# Patient Record
Sex: MALE | Race: WHITE | NOT HISPANIC OR LATINO | Employment: FULL TIME | URBAN - METROPOLITAN AREA
[De-identification: names, ages, dates, MRNs, and addresses within clinical notes are randomized per-mention and may not be internally consistent; named-entity substitution may affect disease eponyms.]

---

## 2019-12-19 DIAGNOSIS — M51.16 NEURITIS OR RADICULITIS DUE TO RUPTURE OF LUMBAR INTERVERTEBRAL DISC: Primary | ICD-10-CM

## 2020-01-06 ENCOUNTER — HOSPITAL ENCOUNTER (OUTPATIENT)
Dept: RADIOLOGY | Facility: HOSPITAL | Age: 35
Discharge: HOME | End: 2020-01-06
Attending: NEUROLOGICAL SURGERY
Payer: COMMERCIAL

## 2020-01-06 DIAGNOSIS — M51.16 NEURITIS OR RADICULITIS DUE TO RUPTURE OF LUMBAR INTERVERTEBRAL DISC: ICD-10-CM

## 2021-08-31 ENCOUNTER — HOSPITAL ENCOUNTER (EMERGENCY)
Facility: HOSPITAL | Age: 36
Discharge: HOME | End: 2021-08-31
Attending: EMERGENCY MEDICINE

## 2021-08-31 VITALS
HEIGHT: 71 IN | TEMPERATURE: 99 F | BODY MASS INDEX: 39.26 KG/M2 | DIASTOLIC BLOOD PRESSURE: 79 MMHG | RESPIRATION RATE: 20 BRPM | SYSTOLIC BLOOD PRESSURE: 137 MMHG | WEIGHT: 280.44 LBS | HEART RATE: 94 BPM | OXYGEN SATURATION: 98 %

## 2021-08-31 DIAGNOSIS — L02.511 ABSCESS OF HAND, RIGHT: ICD-10-CM

## 2021-08-31 DIAGNOSIS — L03.115 CELLULITIS OF RIGHT LOWER EXTREMITY: Primary | ICD-10-CM

## 2021-08-31 PROCEDURE — 96365 THER/PROPH/DIAG IV INF INIT: CPT

## 2021-08-31 PROCEDURE — 99284 EMERGENCY DEPT VISIT MOD MDM: CPT | Mod: 25

## 2021-08-31 PROCEDURE — 25000003 PHARM REV CODE 250: Performed by: PHYSICIAN ASSISTANT

## 2021-08-31 RX ORDER — CLINDAMYCIN HYDROCHLORIDE 150 MG/1
300 CAPSULE ORAL 4 TIMES DAILY
Qty: 56 CAPSULE | Refills: 0 | Status: SHIPPED | OUTPATIENT
Start: 2021-08-31 | End: 2021-09-07

## 2021-08-31 RX ORDER — CLINDAMYCIN PHOSPHATE 600 MG/50ML
600 INJECTION, SOLUTION INTRAVENOUS
Status: COMPLETED | OUTPATIENT
Start: 2021-08-31 | End: 2021-08-31

## 2021-08-31 RX ORDER — HYDROCODONE BITARTRATE AND ACETAMINOPHEN 5; 325 MG/1; MG/1
1 TABLET ORAL EVERY 6 HOURS PRN
Qty: 12 TABLET | Refills: 0 | Status: SHIPPED | OUTPATIENT
Start: 2021-08-31 | End: 2021-09-03

## 2021-08-31 RX ORDER — MUPIROCIN 20 MG/G
OINTMENT TOPICAL 3 TIMES DAILY
Qty: 30 G | Refills: 0 | Status: SHIPPED | OUTPATIENT
Start: 2021-08-31 | End: 2021-09-10

## 2021-08-31 RX ADMIN — CLINDAMYCIN IN 5 PERCENT DEXTROSE 600 MG: 12 INJECTION, SOLUTION INTRAVENOUS at 05:08

## 2022-01-11 ENCOUNTER — HOSPITAL ENCOUNTER (EMERGENCY)
Facility: HOSPITAL | Age: 37
Discharge: HOME | End: 2022-01-11
Attending: EMERGENCY MEDICINE

## 2022-01-11 VITALS
WEIGHT: 280 LBS | DIASTOLIC BLOOD PRESSURE: 85 MMHG | RESPIRATION RATE: 18 BRPM | OXYGEN SATURATION: 99 % | BODY MASS INDEX: 39.2 KG/M2 | HEART RATE: 102 BPM | TEMPERATURE: 98 F | SYSTOLIC BLOOD PRESSURE: 131 MMHG | HEIGHT: 71 IN

## 2022-01-11 DIAGNOSIS — L03.113 CELLULITIS OF RIGHT HAND: ICD-10-CM
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
## 2022-01-11 DIAGNOSIS — S61.451A DOG BITE OF RIGHT HAND, INITIAL ENCOUNTER: Primary | ICD-10-CM

## 2022-01-11 PROCEDURE — 25000003 PHARM REV CODE 250: Performed by: EMERGENCY MEDICINE

## 2022-01-11 PROCEDURE — 90471 IMMUNIZATION ADMIN: CPT | Performed by: EMERGENCY MEDICINE

## 2022-01-11 PROCEDURE — 99284 EMERGENCY DEPT VISIT MOD MDM: CPT

## 2022-01-11 PROCEDURE — 63600175 PHARM REV CODE 636 W HCPCS: Performed by: EMERGENCY MEDICINE

## 2022-01-11 PROCEDURE — 90715 TDAP VACCINE 7 YRS/> IM: CPT | Performed by: EMERGENCY MEDICINE

## 2022-01-11 RX ORDER — AMOXICILLIN AND CLAVULANATE POTASSIUM 875; 125 MG/1; MG/1
1 TABLET, FILM COATED ORAL 2 TIMES DAILY
Qty: 14 TABLET | Refills: 0 | OUTPATIENT
Start: 2022-01-11 | End: 2022-10-26

## 2022-01-11 RX ORDER — AMOXICILLIN AND CLAVULANATE POTASSIUM 875; 125 MG/1; MG/1
1 TABLET, FILM COATED ORAL
Status: COMPLETED | OUTPATIENT
Start: 2022-01-11 | End: 2022-01-11

## 2022-01-11 RX ADMIN — AMOXICILLIN AND CLAVULANATE POTASSIUM 1 TABLET: 875; 125 TABLET, FILM COATED ORAL at 12:01

## 2022-01-11 RX ADMIN — TETANUS TOXOID, REDUCED DIPHTHERIA TOXOID AND ACELLULAR PERTUSSIS VACCINE, ADSORBED 0.5 ML: 5; 2.5; 8; 8; 2.5 SUSPENSION INTRAMUSCULAR at 12:01

## 2022-01-11 NOTE — ED PROVIDER NOTES
Encounter Date: 1/11/2022    SCRIBE #1 NOTE: I, Ebony Bell, am scribing for, and in the presence of, Kenneth Norris III, MD.       History     Chief Complaint   Patient presents with    Animal Bite     Own dog bit rt. Hand 4 days ago      Time seen by provider: 12:05 PM on 01/11/2022    Derik Mccoy is a 36 y.o. male who presents to the ED s/p animal bite with an onset of pain and swelling to right hand. The patient's right hand was reportedly bit by a dog 3 days ago. He has been applying Neosporin to puncture wound and wrapping right hand with a bandage, but swelling to right hand that developed immediately after injury has still persisted. He c/o associated pain to right hand. The patient denies any other symptoms at this time. No pertinent PMHx or PSHx. Tetanus vaccination not UTD. NKDA.    The history is provided by the patient.     Review of patient's allergies indicates:  No Known Allergies  History reviewed. No pertinent past medical history.  History reviewed. No pertinent surgical history.  History reviewed. No pertinent family history.     Review of Systems   Constitutional: Negative for activity change, appetite change, chills, fatigue and fever.   Eyes: Negative for visual disturbance.   Respiratory: Negative for apnea and shortness of breath.    Cardiovascular: Negative for chest pain and palpitations.   Gastrointestinal: Negative for abdominal distention and abdominal pain.   Genitourinary: Negative for difficulty urinating.   Musculoskeletal: Positive for arthralgias (right hand) and joint swelling (right hand). Negative for neck pain.   Skin: Positive for wound (puncture wound, right hand). Negative for pallor and rash.   Neurological: Negative for headaches.   Hematological: Does not bruise/bleed easily.   Psychiatric/Behavioral: Negative for agitation.       Physical Exam     Initial Vitals [01/11/22 1135]   BP Pulse Resp Temp SpO2   131/85 102 18 98.2 °F (36.8 °C) 99 %      MAP        --         Physical Exam    Nursing note and vitals reviewed.  Constitutional: He appears well-developed and well-nourished.   HENT:   Head: Normocephalic and atraumatic.   Eyes: Conjunctivae are normal.   Neck: Neck supple.   Normal range of motion.  Cardiovascular: Normal rate, regular rhythm and normal heart sounds. Exam reveals no gallop and no friction rub.    No murmur heard.  Pulmonary/Chest: Breath sounds normal. No respiratory distress. He has no wheezes. He has no rhonchi. He has no rales.   Abdominal: Abdomen is soft. He exhibits no distension. There is no abdominal tenderness.   Musculoskeletal:         General: Normal range of motion.      Cervical back: Normal range of motion and neck supple.      Comments: Puncture of dorsal and palmar aspect of right hand over the 3rd metacarpal head. Palm has no erythema. Dorsum of hand has erythema, induration, and swelling.      Lymphadenopathy:     He has no cervical adenopathy.   No lymphangitis.   Neurological: He is alert and oriented to person, place, and time.   Skin: Skin is warm and dry.   Psychiatric: He has a normal mood and affect.         ED Course   Procedures  Labs Reviewed - No data to display       Imaging Results    None          Medications   Tdap (BOOSTRIX) vaccine injection 0.5 mL (0.5 mLs Intramuscular Given 1/11/22 1231)   amoxicillin-clavulanate 875-125mg per tablet 1 tablet (1 tablet Oral Given 1/11/22 1229)     Medical Decision Making:   History:   Old Medical Records: I decided to obtain old medical records.  ED Management:  36-year-old male presents with pain swelling and redness 3 days after a dog bite.  Symptoms are consistent with cellulitis which will be treated with Augmentin.  There is no evidence of tenosynovitis.       APC / Resident Notes:   I, Dr. Kenneth Norris III, personally performed the services described in this documentation. All medical record entries made by the scribe were at my direction and in my presence.  I have  reviewed the chart and agree that the record reflects my personal performance and is accurate and complete     Scribe Attestation:   Scribe #1: I performed the above scribed service and the documentation accurately describes the services I performed. I attest to the accuracy of the note.                 Clinical Impression:   Final diagnoses:  [S61.451A, W54.0XXA] Dog bite of right hand, initial encounter (Primary)  [L03.113] Cellulitis of right hand          ED Disposition Condition    Discharge Stable        ED Prescriptions     Medication Sig Dispense Start Date End Date Auth. Provider    amoxicillin-clavulanate 875-125mg (AUGMENTIN) 875-125 mg per tablet Take 1 tablet by mouth 2 (two) times daily. 14 tablet 1/11/2022  Kenneth Norris III, MD        Follow-up Information    None          Kenneth Norris III, MD  01/11/22 3356

## 2022-09-06 ENCOUNTER — HOSPITAL ENCOUNTER (EMERGENCY)
Facility: HOSPITAL | Age: 37
Discharge: HOME | End: 2022-09-06
Attending: EMERGENCY MEDICINE

## 2022-09-06 VITALS
HEART RATE: 89 BPM | BODY MASS INDEX: 38.5 KG/M2 | RESPIRATION RATE: 18 BRPM | OXYGEN SATURATION: 96 % | SYSTOLIC BLOOD PRESSURE: 164 MMHG | HEIGHT: 71 IN | WEIGHT: 275 LBS | DIASTOLIC BLOOD PRESSURE: 97 MMHG | TEMPERATURE: 98 F

## 2022-09-06 DIAGNOSIS — M54.9 BACK PAIN, UNSPECIFIED BACK LOCATION, UNSPECIFIED BACK PAIN LATERALITY, UNSPECIFIED CHRONICITY: Primary | ICD-10-CM

## 2022-09-06 PROCEDURE — 99283 EMERGENCY DEPT VISIT LOW MDM: CPT

## 2022-09-06 NOTE — ED NOTES
Patient needs note to return to work. He has chronic lumbar spine pain from an injury years ago. He said when he stands too long he gets shooting pain down his leg. Yesterday he hit his back when standing up and it caused a pain flare up.

## 2022-09-06 NOTE — ED PROVIDER NOTES
Encounter Date: 9/6/2022       History     Chief Complaint   Patient presents with    Back Pain     Hit back on metal rail yesterday and reports L leg numbness/ tingling at time of accident. Pt has hx of spinal fusion and bone graft in 1539-2994. Needs clearance to return to work.     Patient presents requesting work nerve for back pain.  Patient states he was at work yesterday and was struck by a metal rail to his lower back.  He had a shooting pain down the left leg that now has resolved.  Patient has a history chronic back pain.  His work advised he would need medical clearance and a work note.  He denies any bowel or bladder incontinence.  He denies any radicular pain today although he did have a yesterday.  He does have a history of surgery on his lower back in the past.    Review of patient's allergies indicates:  No Known Allergies  No past medical history on file.  No past surgical history on file.  No family history on file.     Review of Systems   All other systems reviewed and are negative.    Physical Exam     Initial Vitals [09/06/22 0848]   BP Pulse Resp Temp SpO2   (!) 157/88 99 18 98.4 °F (36.9 °C) 98 %      MAP       --         Physical Exam    Nursing note and vitals reviewed.  Constitutional: He appears well-developed and well-nourished. He is not diaphoretic. No distress.   Pleasant, polite.   HENT:   Head: Normocephalic and atraumatic.   Eyes: EOM are normal.   Neck: Neck supple.   Normal range of motion.  Cardiovascular:  Intact distal pulses.           Pulmonary/Chest: No respiratory distress.   Abdominal: Abdomen is soft.   Musculoskeletal:         General: Normal range of motion.      Cervical back: Normal range of motion and neck supple.      Comments: Negative straight leg raise     Neurological: He is alert and oriented to person, place, and time. He has normal strength.   Skin: Skin is warm and dry.   Psychiatric: He has a normal mood and affect. His behavior is normal. Judgment and  "thought content normal.       ED Course   Procedures  Labs Reviewed - No data to display       Imaging Results    None          Medications - No data to display  Medical Decision Making:   Initial Assessment:   No apparent distress  Differential Diagnosis:   Considerations include but are not limited to acute cauda equina syndrome, musculoskeletal spasm, sciatica  ED Management:  Patient presenting with back pain that occurred yesterday which he describes as a "jolt" when a rail struck him.  He has no trauma to his lower back or contusion or bruising.  He has full range of motion of his lower extremities.  There are no signs or symptoms to suggest acute cauda equina syndrome.  Patient was provided work note.  He was offered prednisone and pain medication which he refused.  He was given resources for follow-up.  Patient be discharged stable condition.  Detailed return precautions discussed.                    Clinical Impression:   Final diagnoses:  [M54.9] Back pain, unspecified back location, unspecified back pain laterality, unspecified chronicity (Primary)        ED Disposition Condition    Discharge Stable          ED Prescriptions    None       Follow-up Information       Follow up With Specialties Details Why Contact Info    Tristan Gaffney MD Physical Medicine and Rehabilitation In 1 week  [address] [place] [address] [address]  [address]  [facility] [address]  [phone]               Gary Venegas MD  09/06/22 0941    "

## 2022-10-26 ENCOUNTER — HOSPITAL ENCOUNTER (EMERGENCY)
Facility: HOSPITAL | Age: 37
Discharge: HOME | End: 2022-10-26
Attending: EMERGENCY MEDICINE

## 2022-10-26 VITALS
WEIGHT: 270 LBS | SYSTOLIC BLOOD PRESSURE: 140 MMHG | BODY MASS INDEX: 37.8 KG/M2 | DIASTOLIC BLOOD PRESSURE: 70 MMHG | HEIGHT: 71 IN | HEART RATE: 75 BPM | OXYGEN SATURATION: 99 % | RESPIRATION RATE: 18 BRPM | TEMPERATURE: 98 F

## 2022-10-26 DIAGNOSIS — J06.9 VIRAL URI WITH COUGH: Primary | ICD-10-CM

## 2022-10-26 DIAGNOSIS — R19.7 DIARRHEA, UNSPECIFIED TYPE: ICD-10-CM

## 2022-10-26 DIAGNOSIS — F17.200 TOBACCO DEPENDENCE: ICD-10-CM

## 2022-10-26 DIAGNOSIS — R68.89 FLU-LIKE SYMPTOMS: ICD-10-CM

## 2022-10-26 LAB
INFLUENZA A, MOLECULAR: NEGATIVE
INFLUENZA B, MOLECULAR: NEGATIVE
SARS-COV-2 RDRP RESP QL NAA+PROBE: NEGATIVE
SPECIMEN SOURCE: NORMAL

## 2022-10-26 PROCEDURE — 99282 EMERGENCY DEPT VISIT SF MDM: CPT

## 2022-10-26 PROCEDURE — 87502 INFLUENZA DNA AMP PROBE: CPT | Performed by: NURSE PRACTITIONER

## 2022-10-26 PROCEDURE — U0002 COVID-19 LAB TEST NON-CDC: HCPCS | Performed by: NURSE PRACTITIONER

## 2022-10-26 RX ORDER — DICYCLOMINE HYDROCHLORIDE 20 MG/1
20 TABLET ORAL 2 TIMES DAILY
Qty: 20 TABLET | Refills: 0 | Status: SHIPPED | OUTPATIENT
Start: 2022-10-26 | End: 2022-11-25

## 2022-10-26 RX ORDER — BENZONATATE 100 MG/1
100 CAPSULE ORAL 3 TIMES DAILY PRN
Qty: 20 CAPSULE | Refills: 0 | Status: SHIPPED | OUTPATIENT
Start: 2022-10-26 | End: 2022-11-05

## 2022-10-26 RX ORDER — ALBUTEROL SULFATE 90 UG/1
1-2 AEROSOL, METERED RESPIRATORY (INHALATION) EVERY 6 HOURS PRN
Qty: 8 G | Refills: 0 | Status: SHIPPED | OUTPATIENT
Start: 2022-10-26 | End: 2024-03-23

## 2022-10-26 RX ORDER — ONDANSETRON 4 MG/1
4 TABLET, FILM COATED ORAL EVERY 6 HOURS
Qty: 12 TABLET | Refills: 0 | Status: SHIPPED | OUTPATIENT
Start: 2022-10-26 | End: 2024-03-23

## 2022-10-26 NOTE — ED PROVIDER NOTES
Encounter Date: 10/26/2022       History     Chief Complaint   Patient presents with    Influenza     Flu like symptoms x 1 day.      37-year-old male with history of tobacco to use, states that he vapes and also smokes approximately half pack cigarettes daily, presents to the ER today with complaints nonproductive occasional cough for the past 2 days as well as new onset diarrhea fatigue and nausea symptoms since this morning.  He states he had 5 watery diarrhea bowel movements today since waking up.  No vomiting.  No blood in his stool.  He denies any abdominal pain or flank pain or urinary complaints.  He states he still able to tolerate eating and drinking normally.  His family member recently got over a viral GI bug and wonders if he has it.  Additionally he wonders if he has flu.  He denies any shortness of breath, fever, chest pain, or difficulty breathing.  No rashes.  No other complaints or concerns.  No previous medical history.    Review of patient's allergies indicates:  No Known Allergies  No past medical history on file.  No past surgical history on file.  No family history on file.     Review of Systems   Constitutional:  Positive for fatigue. Negative for chills, diaphoresis and fever.   HENT:  Positive for congestion, postnasal drip and rhinorrhea. Negative for nosebleeds, sinus pressure, sinus pain, sneezing, sore throat and trouble swallowing.    Eyes:  Negative for photophobia and visual disturbance.   Respiratory:  Positive for cough. Negative for choking, chest tightness, shortness of breath, wheezing and stridor.    Cardiovascular:  Negative for chest pain, palpitations and leg swelling.   Gastrointestinal:  Positive for diarrhea and nausea. Negative for abdominal distention, abdominal pain, constipation and vomiting.   Endocrine: Negative for polydipsia, polyphagia and polyuria.   Genitourinary:  Negative for decreased urine volume, difficulty urinating, dysuria, flank pain, frequency and  urgency.   Musculoskeletal:  Negative for arthralgias, back pain, gait problem, myalgias, neck pain and neck stiffness.   Skin:  Negative for rash.   Allergic/Immunologic: Negative for immunocompromised state.   Neurological:  Positive for headaches. Negative for dizziness, seizures, syncope, speech difficulty, weakness and light-headedness.   Hematological:  Does not bruise/bleed easily.   Psychiatric/Behavioral:  Negative for agitation and confusion.    All other systems reviewed and are negative.    Physical Exam     Initial Vitals [10/26/22 1305]   BP Pulse Resp Temp SpO2   133/81 92 17 98.5 °F (36.9 °C) 97 %      MAP       --         Physical Exam    Nursing note and vitals reviewed.  Constitutional: He appears well-developed and well-nourished. He is not diaphoretic. No distress.   HENT:   Head: Normocephalic and atraumatic.   Right Ear: External ear normal.   Left Ear: External ear normal.   Nose: Nose normal.   Mouth/Throat: Oropharynx is clear and moist. No oropharyngeal exudate.   Eyes: Conjunctivae are normal. Pupils are equal, round, and reactive to light.   Neck: Neck supple.   Normal range of motion.  Cardiovascular:  Normal rate.           No murmur heard.  Pulmonary/Chest: Breath sounds normal. No stridor. No respiratory distress. He has no wheezes. He has no rhonchi. He has no rales.   Abdominal: Abdomen is soft. Bowel sounds are normal. He exhibits no distension. There is no abdominal tenderness. There is no rebound and no guarding.   Musculoskeletal:         General: No tenderness or edema. Normal range of motion.      Cervical back: Normal range of motion and neck supple.     Neurological: He is alert and oriented to person, place, and time. He has normal strength. GCS score is 15. GCS eye subscore is 4. GCS verbal subscore is 5. GCS motor subscore is 6.   Skin: Skin is warm and dry. Capillary refill takes less than 2 seconds. No rash noted. No erythema.   Psychiatric: He has a normal mood and  affect. Thought content normal.       ED Course   Procedures  Labs Reviewed   INFLUENZA A AND B ANTIGEN    Narrative:     Specimen Source->Nasopharyngeal Swab   SARS-COV-2 RNA AMPLIFICATION, QUAL     Results for orders placed or performed during the hospital encounter of 10/26/22   Influenza antigen Nasopharyngeal Swab   Result Value Ref Range    Influenza A, Molecular Negative Negative    Influenza B, Molecular Negative Negative    Flu A & B Source Nasal swab    COVID-19 Rapid Screening   Result Value Ref Range    SARS-CoV-2 RNA, Amplification, Qual Negative Negative     Imaging Results    None              Imaging Results    None          Medications - No data to display  Medical Decision Making:   Clinical Tests:   Lab Tests: Ordered and Reviewed  ED Management:  Flu and COVID are both negative today.  His lungs are clear my auscultation exam.  Normal work of breathing.  No rhonchi or decreased breath sounds.  He is afebrile appears in no distress.  He appears clinically hydrated on exam.  Does have any reproducible abdominal pain.  States he has not had a diarrhea bowel movement since arriving to the ER.  No blood in his stool no vomiting.  Likely has a viral illness that will improve with symptomatic management.  We will instruct on adequate hydration, rest, will prescribe Bentyl Zofran for GI symptoms and Tessalon Perles and albuterol for cough and respiratory symptoms.  He has been instructed to follow-up with PCP for re-evaluation in the next 2-3 days.  ER return precautions discussed in detail he understands he region should return for further evaluation if he has any new worsening symptoms or unable to tolerate drinking or eating.           Attending Attestation:     Physician Attestation Statement for NP/PA:       Other NP/PA Attestation Additions:    History of Present Illness: I was not called upon to see this patient but was available for consultation                           Clinical Impression:    Final diagnoses:  [J06.9] Viral URI with cough (Primary)  [R19.7] Diarrhea, unspecified type  [R68.89] Flu-like symptoms  [F17.200] Tobacco dependence        ED Disposition Condition    Discharge Stable          ED Prescriptions       Medication Sig Dispense Start Date End Date Auth. Provider    dicyclomine (BENTYL) 20 mg tablet Take 1 tablet (20 mg total) by mouth 2 (two) times daily. 20 tablet 10/26/2022 11/25/2022 Judi Boogie NP    ondansetron (ZOFRAN) 4 MG tablet Take 1 tablet (4 mg total) by mouth every 6 (six) hours. 12 tablet 10/26/2022 -- Judi Boogie NP    albuterol (PROVENTIL/VENTOLIN HFA) 90 mcg/actuation inhaler Inhale 1-2 puffs into the lungs every 6 (six) hours as needed for Wheezing. Rescue 8 g 10/26/2022 10/26/2023 Judi Boogie NP    benzonatate (TESSALON) 100 MG capsule Take 1 capsule (100 mg total) by mouth 3 (three) times daily as needed for Cough. 20 capsule 10/26/2022 11/5/2022 Judi Boogie NP          Follow-up Information       Follow up With Specialties Details Why Contact Info Additional Information    [facility] Schedule an appointment as soon as possible for a visit in 1 day for ER visit follow up and re-evaluation [address] [facility] [address]  [phone]       [facility] - Emergency Dept Emergency Medicine Go to  As needed, If symptoms worsen [address] [facility] [address] [phone] 1st floor             Judi Boogie NP  10/26/22 162       Judi Boogie NP  10/26/22 1628       Gary Park MD  10/26/22 9006

## 2022-10-26 NOTE — Clinical Note
"Derik Mooreelijah Mccoy was seen and treated in our emergency department on 10/26/2022.  He may return to work on 10/31/2022.       If you have any questions or concerns, please don't hesitate to call.      Judi Boogie NP"

## 2022-12-27 ENCOUNTER — HOSPITAL ENCOUNTER (EMERGENCY)
Facility: HOSPITAL | Age: 37
Discharge: HOME | End: 2022-12-27
Attending: EMERGENCY MEDICINE

## 2022-12-27 VITALS
WEIGHT: 270 LBS | HEART RATE: 89 BPM | OXYGEN SATURATION: 98 % | SYSTOLIC BLOOD PRESSURE: 133 MMHG | DIASTOLIC BLOOD PRESSURE: 81 MMHG | HEIGHT: 71 IN | TEMPERATURE: 98 F | RESPIRATION RATE: 18 BRPM | BODY MASS INDEX: 37.8 KG/M2

## 2022-12-27 DIAGNOSIS — R05.9 COUGH: ICD-10-CM

## 2022-12-27 DIAGNOSIS — J20.9 ACUTE BRONCHITIS, UNSPECIFIED ORGANISM: Primary | ICD-10-CM

## 2022-12-27 PROCEDURE — 87502 INFLUENZA DNA AMP PROBE: CPT | Performed by: NURSE PRACTITIONER

## 2022-12-27 PROCEDURE — U0002 COVID-19 LAB TEST NON-CDC: HCPCS | Performed by: NURSE PRACTITIONER

## 2022-12-27 PROCEDURE — 99283 EMERGENCY DEPT VISIT LOW MDM: CPT | Mod: 25

## 2022-12-27 RX ORDER — PREDNISONE 20 MG/1
20 TABLET ORAL DAILY
Qty: 5 TABLET | Refills: 0 | Status: SHIPPED | OUTPATIENT
Start: 2022-12-27 | End: 2023-01-01

## 2022-12-27 RX ORDER — DOXYCYCLINE 100 MG/1
100 CAPSULE ORAL 2 TIMES DAILY
Qty: 20 CAPSULE | Refills: 0 | Status: SHIPPED | OUTPATIENT
Start: 2022-12-27 | End: 2023-01-06

## 2022-12-27 NOTE — ED PROVIDER NOTES
Encounter Date: 12/27/2022       History     Chief Complaint   Patient presents with    Cough     Pain in R shoulder and back when coughing x a few days      37- year-old male presents to the emergency department with complaint of cough for the last few days he states that he has pain only with coughing to his right anterior shoulder and right anterior chest  only when he coughs since 12/21.  Patient reports that he does smoke he denies any pain with breathing, denies cardiac chest pain, or shortness of breath.  Patient has had no associated fevers with this cough.  Patient reports that he missed work today requesting a note for work    Review of patient's allergies indicates:  No Known Allergies  No past medical history on file.  No past surgical history on file.  No family history on file.     Review of Systems   Constitutional: Negative.    HENT: Negative.     Respiratory:  Positive for cough. Negative for apnea, choking, chest tightness, shortness of breath, wheezing and stridor.    Cardiovascular: Negative.  Negative for chest pain, palpitations and leg swelling.   Gastrointestinal: Negative.    Genitourinary: Negative.    Musculoskeletal: Negative.    Skin: Negative.    Neurological: Negative.    Hematological: Negative.    Psychiatric/Behavioral: Negative.     All other systems reviewed and are negative.    Physical Exam     Initial Vitals [12/27/22 1304]   BP Pulse Resp Temp SpO2   133/81 89 18 98.1 °F (36.7 °C) 98 %      MAP       --         Physical Exam    Nursing note and vitals reviewed.  Constitutional: He appears well-developed and well-nourished.   HENT:   Head: Normocephalic and atraumatic.   Cardiovascular:  Normal rate, regular rhythm, normal heart sounds and intact distal pulses.           Pulmonary/Chest: Breath sounds normal. No respiratory distress. He has no wheezes. He has no rhonchi. He has no rales. He exhibits no tenderness.   Abdominal: Abdomen is soft. Bowel sounds are normal. There is  no abdominal tenderness.     Neurological: He is alert and oriented to person, place, and time. He has normal strength.   Skin: Skin is warm and dry. No rash noted.   Psychiatric: He has a normal mood and affect.       ED Course   Procedures  Labs Reviewed   SARS-COV-2 RNA AMPLIFICATION, QUAL   INFLUENZA A AND B ANTIGEN    Narrative:     Specimen Source->Nasopharyngeal Swab          Imaging Results              X-Ray Chest PA And Lateral (Final result)  Result time 12/27/22 13:37:40   Procedure changed from X-Ray Chest AP Portable     Final result by Tom Aquino MD (12/27/22 13:37:40)                   Narrative:      EXAM: XR CHEST PA AND LATERAL    HISTORY: cough    COMPARISON:Chest x-ray dated 6/13/2018    FINDINGS: The lungs are well-expanded and appear free of infiltrates. There are no pleural effusions. There is no pneumothorax. The cardiomediastinal silhouette is within normal limits.    IMPRESSION:   No evidence of active disease within the chest.    Electronically signed by:  Enzo Aquino MD  12/27/2022 1:37 PM CST Workstation: [id number]                                     Medications - No data to display  Medical Decision Making:   Initial Assessment:   37- year-old male presents to the emergency department with complaint of cough for the last few days he states that he has pain only with coughing to his right anterior shoulder and right anterior chest  only when he coughs since 12/21.  Patient reports that he does smoke he denies any pain with breathing, denies cardiac chest pain, or shortness of breath.  Patient has had no associated fevers with this cough.  Patient reports that he missed work today requesting a note for work    Differential Diagnosis:   Considerations include pneumonia, pleurisy, bronchitis, PE, ACS  ED Management:  37-year-old male presents emergency department with complaint of pain to his right anterior chest that is only present when he coughs.  Patient states that he has  been coughing since December 21st he denies any hemoptysis or coughing up any purulent sputum he also denies any pain with breathing, cardiac chest pain or associated shortness of breath he has no risk factors for PE/DVT although it is in the differential I think this is unlikely at this time.  Patient also has no cardiac risk factors other than smoking however he denies any chest pain, or chest pain with exertion.  Patient has no evidence of respiratory distress or hypoxia on exam was not tachycardic his breath sounds are clear bilaterally x-ray imaging is negative for pneumonia patient will be treated this time for bronchitis and pleurisy with steroids and doxycycline for coverage of atypical in the event of possible pneumonia.  Patient was given return precautions  Additional MDM:   PERC Rule:   Age is greater than or equal to 50 = 0.0  Heart Rate is greater than or equal to 100 = 0.0  SaO2 on room air < 95% = 0.0  Unilateral leg swelling = 0.0  Hemoptysis = 0.0  Recent surgery or trauma = 0.0  Prior PE or DVT =  0.0  Hormone use = 0.00  PERC Score = 0                     Clinical Impression:   Final diagnoses:  [R05.9] Cough  [J20.9] Acute bronchitis, unspecified organism (Primary)        ED Disposition Condition    Discharge Stable          ED Prescriptions       Medication Sig Dispense Start Date End Date Auth. Provider    doxycycline (VIBRAMYCIN) 100 MG Cap Take 1 capsule (100 mg total) by mouth 2 (two) times daily. for 10 days 20 capsule 12/27/2022 1/6/2023 HERBERT Ryan    predniSONE (DELTASONE) 20 MG tablet Take 1 tablet (20 mg total) by mouth once daily. for 5 days 5 tablet 12/27/2022 1/1/2023 HERBERT Ryan          Follow-up Information       Follow up With Specialties Details Why Contact Info    [facility]  Schedule an appointment as soon as possible for a visit in 2 days  [facility] [place] [address]  [phone]               Piper  Weston, DURAN  12/27/22 1500

## 2022-12-27 NOTE — DISCHARGE INSTRUCTIONS
Please refrain from smoking   Take medications as directed   Return to the emergency department if he develops pain with breathing, fever, chest pain or shortness of breath or condition becomes worse  Follow-up as directed

## 2022-12-27 NOTE — FIRST PROVIDER EVALUATION
"Medical screening examination initiated.  I have conducted a focused provider triage encounter, findings are as follows:    Brief history of present illness:  Presents with complaint of a cough.  Onset December 22nd.  Patient is a smoker.  He also complains of right shoulder pain he reports he thinks this is from all the coughing he has been doing denies fever nausea vomiting or diarrhea    Vitals:    12/27/22 1304   BP: 133/81   Pulse: 89   Resp: 18   Temp: 98.1 °F (36.7 °C)   SpO2: 98%   Weight: 122.5 kg (270 lb)   Height: 5' 11" (1.803 m)       Pertinent physical exam:  Heart rate regular lungs clear to auscultation patient is awake alert and oriented    Brief workup plan:  His chest x-ray is negative.  I have ordered a influenza and COVID test on    Preliminary workup initiated; this workup will be continued and followed by the physician or advanced practice provider that is assigned to the patient when roomed.  "

## 2022-12-27 NOTE — Clinical Note
"Derik Mooreelijah Mccoy was seen and treated in our emergency department on 12/27/2022.  He may return to work on 12/28/2022.       If you have any questions or concerns, please don't hesitate to call.      HERBERT Ryan"

## 2023-01-27 ENCOUNTER — HOSPITAL ENCOUNTER (EMERGENCY)
Facility: HOSPITAL | Age: 38
Discharge: HOME | End: 2023-01-27
Attending: EMERGENCY MEDICINE

## 2023-01-27 VITALS
SYSTOLIC BLOOD PRESSURE: 134 MMHG | HEART RATE: 79 BPM | RESPIRATION RATE: 16 BRPM | TEMPERATURE: 98 F | DIASTOLIC BLOOD PRESSURE: 75 MMHG | BODY MASS INDEX: 37.8 KG/M2 | HEIGHT: 71 IN | OXYGEN SATURATION: 97 % | WEIGHT: 270 LBS

## 2023-01-27 DIAGNOSIS — R06.02 SHORTNESS OF BREATH: ICD-10-CM

## 2023-01-27 DIAGNOSIS — R07.9 CHEST PAIN: ICD-10-CM

## 2023-01-27 DIAGNOSIS — R07.1 CHEST PAIN ON BREATHING: Primary | ICD-10-CM

## 2023-01-27 LAB
ALBUMIN SERPL BCP-MCNC: 3.8 G/DL (ref 3.5–5.2)
ALP SERPL-CCNC: 62 U/L (ref 55–135)
ALT SERPL W/O P-5'-P-CCNC: 21 U/L (ref 10–44)
ANION GAP SERPL CALC-SCNC: 9 MMOL/L (ref 8–16)
AST SERPL-CCNC: 19 U/L (ref 10–40)
BASOPHILS # BLD AUTO: 0.1 K/UL (ref 0–0.2)
BASOPHILS NFR BLD: 0.9 % (ref 0–1.9)
BILIRUB SERPL-MCNC: 0.7 MG/DL (ref 0.1–1)
BNP SERPL-MCNC: 28 PG/ML (ref 0–99)
BUN SERPL-MCNC: 14 MG/DL (ref 6–20)
CALCIUM SERPL-MCNC: 9.1 MG/DL (ref 8.7–10.5)
CHLORIDE SERPL-SCNC: 99 MMOL/L (ref 95–110)
CO2 SERPL-SCNC: 28 MMOL/L (ref 23–29)
CREAT SERPL-MCNC: 0.8 MG/DL (ref 0.5–1.4)
D DIMER PPP IA.FEU-MCNC: 1.09 MG/L FEU
DIFFERENTIAL METHOD: ABNORMAL
EOSINOPHIL # BLD AUTO: 0.9 K/UL (ref 0–0.5)
EOSINOPHIL NFR BLD: 7.7 % (ref 0–8)
ERYTHROCYTE [DISTWIDTH] IN BLOOD BY AUTOMATED COUNT: 12.5 % (ref 11.5–14.5)
EST. GFR  (NO RACE VARIABLE): >60 ML/MIN/1.73 M^2
GLUCOSE SERPL-MCNC: 86 MG/DL (ref 70–110)
HCT VFR BLD AUTO: 46 % (ref 40–54)
HGB BLD-MCNC: 15.1 G/DL (ref 14–18)
IMM GRANULOCYTES # BLD AUTO: 0.09 K/UL (ref 0–0.04)
IMM GRANULOCYTES NFR BLD AUTO: 0.8 % (ref 0–0.5)
LYMPHOCYTES # BLD AUTO: 2.6 K/UL (ref 1–4.8)
LYMPHOCYTES NFR BLD: 22.4 % (ref 18–48)
MCH RBC QN AUTO: 29.4 PG (ref 27–31)
MCHC RBC AUTO-ENTMCNC: 32.8 G/DL (ref 32–36)
MCV RBC AUTO: 90 FL (ref 82–98)
MONOCYTES # BLD AUTO: 1.2 K/UL (ref 0.3–1)
MONOCYTES NFR BLD: 10 % (ref 4–15)
NEUTROPHILS # BLD AUTO: 6.8 K/UL (ref 1.8–7.7)
NEUTROPHILS NFR BLD: 58.2 % (ref 38–73)
NRBC BLD-RTO: 0 /100 WBC
PLATELET # BLD AUTO: 294 K/UL (ref 150–450)
PMV BLD AUTO: 10.4 FL (ref 9.2–12.9)
POTASSIUM SERPL-SCNC: 4.3 MMOL/L (ref 3.5–5.1)
PROT SERPL-MCNC: 7.3 G/DL (ref 6–8.4)
RBC # BLD AUTO: 5.14 M/UL (ref 4.6–6.2)
SODIUM SERPL-SCNC: 136 MMOL/L (ref 136–145)
TROPONIN I SERPL HS-MCNC: 2.3 PG/ML (ref 0–14.9)
TROPONIN I SERPL HS-MCNC: <2.3 PG/ML (ref 0–14.9)
WBC # BLD AUTO: 11.63 K/UL (ref 3.9–12.7)

## 2023-01-27 PROCEDURE — 25000003 PHARM REV CODE 250: Performed by: EMERGENCY MEDICINE

## 2023-01-27 PROCEDURE — 85025 COMPLETE CBC W/AUTO DIFF WBC: CPT | Performed by: EMERGENCY MEDICINE

## 2023-01-27 PROCEDURE — 80053 COMPREHEN METABOLIC PANEL: CPT | Performed by: EMERGENCY MEDICINE

## 2023-01-27 PROCEDURE — 93010 ELECTROCARDIOGRAM REPORT: CPT | Mod: ,,, | Performed by: INTERNAL MEDICINE

## 2023-01-27 PROCEDURE — 25500020 PHARM REV CODE 255: Performed by: EMERGENCY MEDICINE

## 2023-01-27 PROCEDURE — 93005 ELECTROCARDIOGRAM TRACING: CPT | Performed by: INTERNAL MEDICINE

## 2023-01-27 PROCEDURE — 93010 EKG 12-LEAD: ICD-10-PCS | Mod: ,,, | Performed by: INTERNAL MEDICINE

## 2023-01-27 PROCEDURE — 84484 ASSAY OF TROPONIN QUANT: CPT | Performed by: EMERGENCY MEDICINE

## 2023-01-27 PROCEDURE — 83880 ASSAY OF NATRIURETIC PEPTIDE: CPT | Performed by: EMERGENCY MEDICINE

## 2023-01-27 PROCEDURE — 85379 FIBRIN DEGRADATION QUANT: CPT | Performed by: EMERGENCY MEDICINE

## 2023-01-27 PROCEDURE — 99285 EMERGENCY DEPT VISIT HI MDM: CPT | Mod: 25

## 2023-01-27 RX ORDER — METHOCARBAMOL 500 MG/1
1000 TABLET, FILM COATED ORAL 3 TIMES DAILY
Qty: 30 TABLET | Refills: 0 | Status: SHIPPED | OUTPATIENT
Start: 2023-01-27 | End: 2023-02-01

## 2023-01-27 RX ORDER — NAPROXEN 500 MG/1
500 TABLET ORAL 2 TIMES DAILY WITH MEALS
Qty: 30 TABLET | Refills: 0 | Status: SHIPPED | OUTPATIENT
Start: 2023-01-27 | End: 2024-03-23

## 2023-01-27 RX ORDER — ASPIRIN 325 MG
325 TABLET ORAL
Status: COMPLETED | OUTPATIENT
Start: 2023-01-27 | End: 2023-01-27

## 2023-01-27 RX ADMIN — ASPIRIN 325 MG ORAL TABLET 325 MG: 325 PILL ORAL at 03:01

## 2023-01-27 RX ADMIN — IOHEXOL 100 ML: 350 INJECTION, SOLUTION INTRAVENOUS at 05:01

## 2023-01-27 NOTE — Clinical Note
"Derik Mooreelijah Mccoy was seen and treated in our emergency department on 1/27/2023.  He may return to work on 01/30/2023.       If you have any questions or concerns, please don't hesitate to call.      Dawit Foley MD"

## 2023-01-27 NOTE — ED PROVIDER NOTES
Encounter Date: 1/27/2023       History     Chief Complaint   Patient presents with    Chest Pain     States having pain when he takes a deep breath to left lateral chest      37-year-old male presented emergency department with 2 days of left-sided chest wall pain worse with movement and worse with a deep inspiration.  Patient is a smoker and patient states he has some chronic cough.  Denies fever chills.  Denies nausea vomiting or abdominal pain or weakness or numbness and patient describes this pain as mild to moderate soreness.  Palpation and worse with inspiration and coughing    Review of patient's allergies indicates:  No Known Allergies  No past medical history on file.  No past surgical history on file.  No family history on file.     Review of Systems   Constitutional: Negative.    HENT: Negative.     Eyes: Negative.    Respiratory: Negative.     Cardiovascular:  Positive for chest pain.   Gastrointestinal: Negative.    Endocrine: Negative.    Genitourinary: Negative.    Musculoskeletal: Negative.    Skin: Negative.    Allergic/Immunologic: Negative.    Neurological: Negative.    Hematological: Negative.    Psychiatric/Behavioral: Negative.     All other systems reviewed and are negative.    Physical Exam     Initial Vitals [01/27/23 1337]   BP Pulse Resp Temp SpO2   (!) 142/89 85 18 98.2 °F (36.8 °C) 96 %      MAP       --         Physical Exam    Nursing note and vitals reviewed.  Constitutional: He appears well-developed and well-nourished.   HENT:   Head: Normocephalic and atraumatic.   Nose: Nose normal.   Eyes: Conjunctivae and EOM are normal.   Neck: No tracheal deviation present.   Normal range of motion.  Cardiovascular:  Normal rate, regular rhythm, normal heart sounds and intact distal pulses.     Exam reveals no friction rub.       No murmur heard.  Pulmonary/Chest: Breath sounds normal. No respiratory distress. He has no wheezes. He has no rales. He exhibits tenderness.   Abdominal: Abdomen is  soft. He exhibits no distension. There is no abdominal tenderness.   Musculoskeletal:         General: Normal range of motion.      Cervical back: Normal range of motion.     Neurological: He is alert and oriented to person, place, and time. He has normal strength. No cranial nerve deficit or sensory deficit. GCS score is 15. GCS eye subscore is 4. GCS verbal subscore is 5. GCS motor subscore is 6.   Skin: Skin is warm and dry. Capillary refill takes less than 2 seconds.   Psychiatric: He has a normal mood and affect. Thought content normal.       ED Course   Procedures  Labs Reviewed   CBC W/ AUTO DIFFERENTIAL - Abnormal; Notable for the following components:       Result Value    Immature Granulocytes 0.8 (*)     Immature Grans (Abs) 0.09 (*)     Mono # 1.2 (*)     Eos # 0.9 (*)     All other components within normal limits   D DIMER, QUANTITATIVE - Abnormal; Notable for the following components:    D-Dimer 1.09 (*)     All other components within normal limits    Narrative:     D-Dimer critical result(s) repeated. Called and verbal readback   obtained from Kisha Villatoro RN by [facility] 01/27/2023 16:15   COMPREHENSIVE METABOLIC PANEL   TROPONIN I HIGH SENSITIVITY   B-TYPE NATRIURETIC PEPTIDE   TROPONIN I HIGH SENSITIVITY     EKG Readings: (Independently Interpreted)   Rhythm: Normal Sinus Rhythm. Ectopy: No Ectopy. Conduction: Normal. ST Segments: Normal ST Segments. T Waves: Normal. Clinical Impression: Normal Sinus Rhythm   ECG Results              EKG 12-lead (In process)  Result time 01/27/23 13:57:05      In process by Interface, Lab In [facility] (01/27/23 13:57:05)                   Narrative:    Test Reason : R07.9,    Vent. Rate : 081 BPM     Atrial Rate : 081 BPM     P-R Int : 154 ms          QRS Dur : 096 ms      QT Int : 366 ms       P-R-T Axes : 041 020 022 degrees     QTc Int : 425 ms    Normal sinus rhythm  Normal ECG  No previous ECGs available    Referred By: [email or web address]   SELF           Confirmed By:                                    Imaging Results              CTA Chest Non-Coronary (PE Studies) (Final result)  Result time 01/27/23 18:04:29      Final result by Mikal Whitney MD (01/27/23 18:04:29)                   Narrative:    CMS MANDATED QUALITY DATA - CT RADIATION - 436    All CT scans at this facility utilize dose modulation, iterative reconstruction, and/or weight based dosing when appropriate to reduce radiation dose to as low as reasonably achievable.        REASON: Pulmonary embolism (PE) suspected, positive D-dimer    TECHNIQUE: CT angiography of thorax with 100 mL Omnipaque 350.   Maximum intensity projection coronal reformations were created at a separate workstation and stored in the patient's permanent medical record.    COMPARISON: None    FINDINGS:    No pulmonary embolism identified. The thoracic aorta is normal caliber. There is no mediastinal lymphadenopathy or mass. Heart size is normal.    The central tracheobronchial tree is patent. There is mild bilateral dependent subsegmental atelectasis. The lungs are otherwise clear.    The visualized abdominal viscera are unremarkable. No acute osseous abnormality. Healing fracture deformity with callus formation of the posterior lateral right upper rib noted.    IMPRESSION:    No pulmonary embolism identified.    Electronically signed by:  Mikal Whitney DO  1/27/2023 6:04 PM CST Workstation: [id number]                                     X-Ray Chest AP Portable (Final result)  Result time 01/27/23 14:36:50      Final result by Nasir Jenkins MD (01/27/23 14:36:50)                   Narrative:    HISTORY: Left flank pain,  axillary pain.    FINDINGS: Portable chest radiograph at 1411 hours compared to prior exams shows the cardiomediastinal silhouette and pulmonary vasculature are within normal limits.    The lungs are normally expanded, with no consolidation, large pleural effusion, or evidence of pulmonary edema. No confluent infiltrates or  pneumothorax. There are no significant osseous abnormalities.    IMPRESSION: No evidence of active cardiopulmonary disease.    Electronically signed by:  Nasir Jenkins MD  1/27/2023 2:36 PM [facility] Workstation: [id number]                                     Medications   aspirin tablet 325 mg (325 mg Oral Given 1/27/23 6005)   iohexoL (OMNIPAQUE 350) injection 100 mL (100 mLs Intravenous Given 1/27/23 1715)     Medical Decision Making:   Differential Diagnosis:   37-year-old male presented emergency department with left-sided chest pain worse with movement and inspiration and coughing.  Patient's pain is pleuritic in nature and reproducible.  Does not appear to be cardiac.  Screening cardiac workup unremarkable and given pleuritic nature pain D-dimer done which is positive so CT of the chest done which showed no evidence of pulmonary emboli.  I believe patient's pain is musculoskeletal in nature.  Discharged with return precautions and instructions and follow-up as workup negative EKG and labs reviewed and are within normal limits.  D-dimer elevated.  Two sets of troponin negative.  Discharged with outpatient stress test scheduled  Clinical Tests:   Lab Tests: Reviewed  Radiological Study: Reviewed  Medical Tests: Reviewed                        Clinical Impression:   Final diagnoses:  [R07.9] Chest pain  [R06.02] Shortness of breath  [R07.1] Chest pain on breathing (Primary)        ED Disposition Condition    Discharge Stable          ED Prescriptions       Medication Sig Dispense Start Date End Date Auth. Provider    naproxen (NAPROSYN) 500 MG tablet Take 1 tablet (500 mg total) by mouth 2 (two) times daily with meals. 30 tablet 1/27/2023 -- Dawit Foley MD    methocarbamoL (ROBAXIN) 500 MG Tab Take 2 tablets (1,000 mg total) by mouth 3 (three) times daily. for 5 days 30 tablet 1/27/2023 2/1/2023 Dawit Foley MD          Follow-up Information       Follow up With Specialties Details Why Contact Info    [facility]  [facility]  In 2 days  [address] [address]  [phone]               Dawit Foley MD  01/27/23 7535

## 2023-01-30 ENCOUNTER — TELEPHONE (OUTPATIENT)
Dept: CARDIOLOGY | Facility: HOSPITAL | Age: 38
End: 2023-01-30

## 2023-01-30 NOTE — TELEPHONE ENCOUNTER
Patient was contacted to schedule a out patient stress test, however Due to the patient not having insurance he wanted to speak to someone before scheduling his information was forwarded to case management to assist

## 2023-11-03 ENCOUNTER — HOSPITAL ENCOUNTER (EMERGENCY)
Facility: HOSPITAL | Age: 38
Discharge: HOME | End: 2023-11-03
Attending: EMERGENCY MEDICINE

## 2023-11-03 VITALS
DIASTOLIC BLOOD PRESSURE: 79 MMHG | WEIGHT: 275 LBS | OXYGEN SATURATION: 97 % | BODY MASS INDEX: 38.35 KG/M2 | TEMPERATURE: 98 F | HEART RATE: 90 BPM | SYSTOLIC BLOOD PRESSURE: 137 MMHG | RESPIRATION RATE: 18 BRPM

## 2023-11-03 DIAGNOSIS — B34.9 VIRAL SYNDROME: ICD-10-CM

## 2023-11-03 DIAGNOSIS — R10.9 ABDOMINAL PAIN, UNSPECIFIED ABDOMINAL LOCATION: Primary | ICD-10-CM

## 2023-11-03 LAB
BILIRUB UR QL STRIP: NEGATIVE
CLARITY UR: CLEAR
COLOR UR: YELLOW
GLUCOSE UR QL STRIP: NEGATIVE
HGB UR QL STRIP: NEGATIVE
INFLUENZA A, MOLECULAR: NEGATIVE
INFLUENZA B, MOLECULAR: NEGATIVE
KETONES UR QL STRIP: NEGATIVE
LEUKOCYTE ESTERASE UR QL STRIP: NEGATIVE
NITRITE UR QL STRIP: NEGATIVE
PH UR STRIP: 6 [PH] (ref 5–8)
PROT UR QL STRIP: NEGATIVE
SARS-COV-2 RDRP RESP QL NAA+PROBE: NEGATIVE
SP GR UR STRIP: 1.02 (ref 1–1.03)
SPECIMEN SOURCE: NORMAL
URN SPEC COLLECT METH UR: NORMAL
UROBILINOGEN UR STRIP-ACNC: NEGATIVE EU/DL

## 2023-11-03 PROCEDURE — 87502 INFLUENZA DNA AMP PROBE: CPT | Performed by: STUDENT IN AN ORGANIZED HEALTH CARE EDUCATION/TRAINING PROGRAM

## 2023-11-03 PROCEDURE — U0002 COVID-19 LAB TEST NON-CDC: HCPCS | Performed by: STUDENT IN AN ORGANIZED HEALTH CARE EDUCATION/TRAINING PROGRAM

## 2023-11-03 PROCEDURE — 25000003 PHARM REV CODE 250: Performed by: STUDENT IN AN ORGANIZED HEALTH CARE EDUCATION/TRAINING PROGRAM

## 2023-11-03 PROCEDURE — 99283 EMERGENCY DEPT VISIT LOW MDM: CPT

## 2023-11-03 PROCEDURE — 81003 URINALYSIS AUTO W/O SCOPE: CPT | Performed by: EMERGENCY MEDICINE

## 2023-11-03 RX ORDER — ACETAMINOPHEN 325 MG/1
650 TABLET ORAL
Status: COMPLETED | OUTPATIENT
Start: 2023-11-03 | End: 2023-11-03

## 2023-11-03 RX ORDER — DICYCLOMINE HYDROCHLORIDE 10 MG/1
20 CAPSULE ORAL
Status: COMPLETED | OUTPATIENT
Start: 2023-11-03 | End: 2023-11-03

## 2023-11-03 RX ADMIN — DICYCLOMINE HYDROCHLORIDE 20 MG: 10 CAPSULE ORAL at 02:11

## 2023-11-03 RX ADMIN — ACETAMINOPHEN 650 MG: 325 TABLET ORAL at 02:11

## 2023-11-03 NOTE — ED PROVIDER NOTES
Encounter Date: 11/3/2023       History     Chief Complaint   Patient presents with    Abdominal Pain     With nausea and lightheadedness. Son has influenza in house and wants to be tested.      Past medical history who presents with nausea, lightheadedness, abdominal pain.  Patient states that his son tested positive for flu and he is concerned that he might have as well.  Abdominal pain is intermittent, 2/10, cramping, not associated with any emesis or diarrhea.  He is currently denying any nausea at this time.  No blood in his stool urine, no urgency, frequency, dysuria.  No abdominal trauma.        Review of patient's allergies indicates:  No Known Allergies  No past medical history on file.  No past surgical history on file.  No family history on file.     Review of Systems   Constitutional:  Negative for fever.   HENT:  Positive for congestion. Negative for sore throat.    Respiratory:  Positive for cough. Negative for shortness of breath.    Cardiovascular:  Negative for chest pain.   Gastrointestinal:  Positive for abdominal pain. Negative for nausea.   Genitourinary:  Negative for dysuria.   Musculoskeletal:  Negative for back pain.   Skin:  Negative for rash.   Neurological:  Negative for weakness.   Hematological:  Does not bruise/bleed easily.       Physical Exam     Initial Vitals   BP Pulse Resp Temp SpO2   11/03/23 0029 11/03/23 0029 11/03/23 0031 11/03/23 0029 11/03/23 0029   (!) 148/85 90 19 97.9 °F (36.6 °C) 97 %      MAP       --                Physical Exam    Nursing note and vitals reviewed.  Constitutional: He appears well-developed and well-nourished.   HENT:   Head: Normocephalic and atraumatic.   Right Ear: External ear normal.   Left Ear: External ear normal.   Eyes: EOM are normal. Pupils are equal, round, and reactive to light.   Neck: No thyromegaly present. No tracheal deviation present. No JVD present.   Normal range of motion.  Cardiovascular:  Normal rate and regular rhythm.      Exam reveals no friction rub.       No murmur heard.  Pulmonary/Chest: Breath sounds normal. No stridor. No respiratory distress. He has no wheezes. He has no rales.   Abdominal: Abdomen is soft. He exhibits no distension. There is no abdominal tenderness.   Genitourinary:    Penis normal.     Musculoskeletal:         General: Normal range of motion.      Cervical back: Normal range of motion.     Neurological: He is alert and oriented to person, place, and time. GCS score is 15. GCS eye subscore is 4. GCS verbal subscore is 5. GCS motor subscore is 6.   Skin: Skin is warm and dry. Capillary refill takes less than 2 seconds.         ED Course   Procedures  Labs Reviewed   URINALYSIS, REFLEX TO URINE CULTURE    Narrative:     Specimen Source->Urine   SARS-COV-2 RNA AMPLIFICATION, QUAL   INFLUENZA A AND B ANTIGEN    Narrative:     Specimen Source->Nasopharyngeal Swab          Imaging Results    None          Medications   acetaminophen tablet 650 mg (650 mg Oral Given 11/3/23 0237)   dicyclomine capsule 20 mg (20 mg Oral Given 11/3/23 0236)     Medical Decision Making  38-year-old gentleman here with concerns for flu.  Vitals stable, afebrile.  COVID/flu was negative.  Patient was sent home with strict return precautions, likely viral syndrome.    Amount and/or Complexity of Data Reviewed  Labs: ordered.    Risk  OTC drugs.  Prescription drug management.              Attending Attestation:   Physician Attestation Statement for Resident:  As the supervising MD   I agree with the above history.  -:   As the supervising MD I agree with the above PE.     As the supervising MD I agree with the above treatment, course, plan, and disposition.    I have reviewed and agree with the residents interpretation of the following: lab data.                                 Clinical Impression:   Final diagnoses:  [R10.9] Abdominal pain, unspecified abdominal location (Primary)  [B34.9] Viral syndrome        ED Disposition Condition     Discharge Stable          ED Prescriptions    None       Follow-up Information       Follow up With Specialties Details Why Contact Info Additional Information    [facility] - Emergency Dept Emergency Medicine Go to  As needed, If symptoms worsen [address] [place] [id number] [phone] 1st floor             Mel Carter MD  Resident  11/03/23 0409       Gilbert Juarez MD  11/03/23 2773

## 2023-11-03 NOTE — Clinical Note
"Derik Mooreelijah Mccoy was seen and treated in our emergency department on 11/3/2023.  He may return to work on 11/05/2023.       If you have any questions or concerns, please don't hesitate to call.      Mel Carter MD"

## 2024-03-23 ENCOUNTER — HOSPITAL ENCOUNTER (OUTPATIENT)
Facility: HOSPITAL | Age: 39
Discharge: LEFT BEFORE DISCHARGE | End: 2024-03-24
Attending: EMERGENCY MEDICINE | Admitting: INTERNAL MEDICINE

## 2024-03-23 DIAGNOSIS — L03.90 CELLULITIS: ICD-10-CM

## 2024-03-23 DIAGNOSIS — A41.9 SEPSIS: ICD-10-CM

## 2024-03-23 DIAGNOSIS — R07.9 CHEST PAIN: ICD-10-CM

## 2024-03-23 PROBLEM — E87.1 HYPONATREMIA: Status: ACTIVE | Noted: 2024-03-23

## 2024-03-23 PROBLEM — L03.115 CELLULITIS OF RIGHT LOWER EXTREMITY: Status: ACTIVE | Noted: 2024-03-23

## 2024-03-23 LAB
ALBUMIN SERPL BCP-MCNC: 3.9 G/DL (ref 3.5–5.2)
ALP SERPL-CCNC: 55 U/L (ref 55–135)
ALT SERPL W/O P-5'-P-CCNC: 43 U/L (ref 10–44)
ANION GAP SERPL CALC-SCNC: 9 MMOL/L (ref 8–16)
AST SERPL-CCNC: 47 U/L (ref 10–40)
BASOPHILS # BLD AUTO: 0.05 K/UL (ref 0–0.2)
BASOPHILS NFR BLD: 0.3 % (ref 0–1.9)
BILIRUB SERPL-MCNC: 0.7 MG/DL (ref 0.1–1)
BUN SERPL-MCNC: 13 MG/DL (ref 6–20)
CALCIUM SERPL-MCNC: 8.7 MG/DL (ref 8.7–10.5)
CHLORIDE SERPL-SCNC: 100 MMOL/L (ref 95–110)
CO2 SERPL-SCNC: 22 MMOL/L (ref 23–29)
CREAT SERPL-MCNC: 0.9 MG/DL (ref 0.5–1.4)
DIFFERENTIAL METHOD BLD: ABNORMAL
EOSINOPHIL # BLD AUTO: 0 K/UL (ref 0–0.5)
EOSINOPHIL NFR BLD: 0.1 % (ref 0–8)
ERYTHROCYTE [DISTWIDTH] IN BLOOD BY AUTOMATED COUNT: 13.5 % (ref 11.5–14.5)
EST. GFR  (NO RACE VARIABLE): >60 ML/MIN/1.73 M^2
GLUCOSE SERPL-MCNC: 103 MG/DL (ref 70–110)
HCT VFR BLD AUTO: 43.8 % (ref 40–54)
HGB BLD-MCNC: 14.9 G/DL (ref 14–18)
IMM GRANULOCYTES # BLD AUTO: 0.13 K/UL (ref 0–0.04)
IMM GRANULOCYTES NFR BLD AUTO: 0.8 % (ref 0–0.5)
LACTATE SERPL-SCNC: 0.9 MMOL/L (ref 0.5–1.9)
LYMPHOCYTES # BLD AUTO: 1.1 K/UL (ref 1–4.8)
LYMPHOCYTES NFR BLD: 6.9 % (ref 18–48)
MAGNESIUM SERPL-MCNC: 1.7 MG/DL (ref 1.6–2.6)
MCH RBC QN AUTO: 29.7 PG (ref 27–31)
MCHC RBC AUTO-ENTMCNC: 34 G/DL (ref 32–36)
MCV RBC AUTO: 87 FL (ref 82–98)
MONOCYTES # BLD AUTO: 1.4 K/UL (ref 0.3–1)
MONOCYTES NFR BLD: 8.8 % (ref 4–15)
NEUTROPHILS # BLD AUTO: 13 K/UL (ref 1.8–7.7)
NEUTROPHILS NFR BLD: 83.1 % (ref 38–73)
NRBC BLD-RTO: 0 /100 WBC
PHOSPHATE SERPL-MCNC: 2 MG/DL (ref 2.7–4.5)
PLATELET # BLD AUTO: 212 K/UL (ref 150–450)
PMV BLD AUTO: 10.9 FL (ref 9.2–12.9)
POTASSIUM SERPL-SCNC: 3.6 MMOL/L (ref 3.5–5.1)
PROT SERPL-MCNC: 7 G/DL (ref 6–8.4)
RBC # BLD AUTO: 5.01 M/UL (ref 4.6–6.2)
SODIUM SERPL-SCNC: 131 MMOL/L (ref 136–145)
WBC # BLD AUTO: 15.64 K/UL (ref 3.9–12.7)

## 2024-03-23 PROCEDURE — 84100 ASSAY OF PHOSPHORUS: CPT

## 2024-03-23 PROCEDURE — 83735 ASSAY OF MAGNESIUM: CPT

## 2024-03-23 PROCEDURE — 85025 COMPLETE CBC W/AUTO DIFF WBC: CPT

## 2024-03-23 PROCEDURE — 93010 ELECTROCARDIOGRAM REPORT: CPT | Mod: ,,, | Performed by: INTERNAL MEDICINE

## 2024-03-23 PROCEDURE — 25000003 PHARM REV CODE 250: Performed by: EMERGENCY MEDICINE

## 2024-03-23 PROCEDURE — 96366 THER/PROPH/DIAG IV INF ADDON: CPT

## 2024-03-23 PROCEDURE — 83605 ASSAY OF LACTIC ACID: CPT

## 2024-03-23 PROCEDURE — 63600175 PHARM REV CODE 636 W HCPCS: Performed by: EMERGENCY MEDICINE

## 2024-03-23 PROCEDURE — 96361 HYDRATE IV INFUSION ADD-ON: CPT

## 2024-03-23 PROCEDURE — 36415 COLL VENOUS BLD VENIPUNCTURE: CPT

## 2024-03-23 PROCEDURE — 93005 ELECTROCARDIOGRAM TRACING: CPT | Performed by: INTERNAL MEDICINE

## 2024-03-23 PROCEDURE — 80053 COMPREHEN METABOLIC PANEL: CPT

## 2024-03-23 PROCEDURE — 87040 BLOOD CULTURE FOR BACTERIA: CPT

## 2024-03-23 PROCEDURE — G0378 HOSPITAL OBSERVATION PER HR: HCPCS

## 2024-03-23 PROCEDURE — 96365 THER/PROPH/DIAG IV INF INIT: CPT

## 2024-03-23 PROCEDURE — 96367 TX/PROPH/DG ADDL SEQ IV INF: CPT

## 2024-03-23 PROCEDURE — 99285 EMERGENCY DEPT VISIT HI MDM: CPT | Mod: 25

## 2024-03-23 RX ORDER — ONDANSETRON HYDROCHLORIDE 2 MG/ML
4 INJECTION, SOLUTION INTRAVENOUS EVERY 6 HOURS PRN
Status: DISCONTINUED | OUTPATIENT
Start: 2024-03-23 | End: 2024-03-24 | Stop reason: HOSPADM

## 2024-03-23 RX ORDER — ASCORBIC ACID/MULTIVIT-MIN 1000 MG
1 EFFERVESCENT POWDER IN PACKET ORAL DAILY
COMMUNITY

## 2024-03-23 RX ORDER — SODIUM,POTASSIUM PHOSPHATES 280-250MG
2 POWDER IN PACKET (EA) ORAL
Status: DISCONTINUED | OUTPATIENT
Start: 2024-03-23 | End: 2024-03-24 | Stop reason: HOSPADM

## 2024-03-23 RX ORDER — SULFAMETHOXAZOLE AND TRIMETHOPRIM 800; 160 MG/1; MG/1
1 TABLET ORAL 2 TIMES DAILY
Qty: 20 TABLET | Refills: 0 | Status: SHIPPED | OUTPATIENT
Start: 2024-03-23 | End: 2024-04-02

## 2024-03-23 RX ORDER — HYDROCODONE BITARTRATE AND ACETAMINOPHEN 5; 325 MG/1; MG/1
1 TABLET ORAL EVERY 6 HOURS PRN
Status: DISCONTINUED | OUTPATIENT
Start: 2024-03-23 | End: 2024-03-24 | Stop reason: HOSPADM

## 2024-03-23 RX ORDER — NALOXONE HCL 0.4 MG/ML
0.02 VIAL (ML) INJECTION
Status: DISCONTINUED | OUTPATIENT
Start: 2024-03-23 | End: 2024-03-24 | Stop reason: HOSPADM

## 2024-03-23 RX ORDER — ACETAMINOPHEN 500 MG
1000 TABLET ORAL
Status: COMPLETED | OUTPATIENT
Start: 2024-03-23 | End: 2024-03-23

## 2024-03-23 RX ORDER — LANOLIN ALCOHOL/MO/W.PET/CERES
800 CREAM (GRAM) TOPICAL
Status: DISCONTINUED | OUTPATIENT
Start: 2024-03-23 | End: 2024-03-24 | Stop reason: HOSPADM

## 2024-03-23 RX ORDER — VANCOMYCIN HCL IN 5 % DEXTROSE 1G/250ML
1000 PLASTIC BAG, INJECTION (ML) INTRAVENOUS ONCE
Status: COMPLETED | OUTPATIENT
Start: 2024-03-23 | End: 2024-03-24

## 2024-03-23 RX ORDER — SODIUM CHLORIDE 0.9 % (FLUSH) 0.9 %
2 SYRINGE (ML) INJECTION EVERY 12 HOURS PRN
Status: DISCONTINUED | OUTPATIENT
Start: 2024-03-23 | End: 2024-03-24 | Stop reason: HOSPADM

## 2024-03-23 RX ORDER — IBUPROFEN 200 MG
16 TABLET ORAL
Status: DISCONTINUED | OUTPATIENT
Start: 2024-03-23 | End: 2024-03-24 | Stop reason: HOSPADM

## 2024-03-23 RX ORDER — GLUCAGON 1 MG
1 KIT INJECTION
Status: DISCONTINUED | OUTPATIENT
Start: 2024-03-23 | End: 2024-03-24 | Stop reason: HOSPADM

## 2024-03-23 RX ORDER — TALC
6 POWDER (GRAM) TOPICAL NIGHTLY PRN
Status: DISCONTINUED | OUTPATIENT
Start: 2024-03-23 | End: 2024-03-24 | Stop reason: HOSPADM

## 2024-03-23 RX ORDER — ALUMINUM HYDROXIDE, MAGNESIUM HYDROXIDE, AND SIMETHICONE 1200; 120; 1200 MG/30ML; MG/30ML; MG/30ML
30 SUSPENSION ORAL 4 TIMES DAILY PRN
Status: DISCONTINUED | OUTPATIENT
Start: 2024-03-23 | End: 2024-03-24 | Stop reason: HOSPADM

## 2024-03-23 RX ORDER — IBUPROFEN 200 MG
24 TABLET ORAL
Status: DISCONTINUED | OUTPATIENT
Start: 2024-03-23 | End: 2024-03-24 | Stop reason: HOSPADM

## 2024-03-23 RX ORDER — DOXYCYCLINE 100 MG/1
100 CAPSULE ORAL 2 TIMES DAILY
Qty: 20 CAPSULE | Refills: 0 | Status: SHIPPED | OUTPATIENT
Start: 2024-03-23 | End: 2024-04-02

## 2024-03-23 RX ORDER — ACETAMINOPHEN 325 MG/1
650 TABLET ORAL EVERY 8 HOURS PRN
Status: DISCONTINUED | OUTPATIENT
Start: 2024-03-23 | End: 2024-03-24 | Stop reason: HOSPADM

## 2024-03-23 RX ORDER — ACETAMINOPHEN 325 MG/1
650 TABLET ORAL EVERY 4 HOURS PRN
Status: DISCONTINUED | OUTPATIENT
Start: 2024-03-23 | End: 2024-03-24 | Stop reason: HOSPADM

## 2024-03-23 RX ORDER — ENOXAPARIN SODIUM 100 MG/ML
40 INJECTION SUBCUTANEOUS EVERY 24 HOURS
Status: DISCONTINUED | OUTPATIENT
Start: 2024-03-23 | End: 2024-03-24 | Stop reason: HOSPADM

## 2024-03-23 RX ADMIN — SODIUM CHLORIDE, POTASSIUM CHLORIDE, SODIUM LACTATE AND CALCIUM CHLORIDE 3402 ML: 600; 310; 30; 20 INJECTION, SOLUTION INTRAVENOUS at 08:03

## 2024-03-23 RX ADMIN — VANCOMYCIN HYDROCHLORIDE 1000 MG: 1 INJECTION, POWDER, LYOPHILIZED, FOR SOLUTION INTRAVENOUS at 09:03

## 2024-03-23 RX ADMIN — ACETAMINOPHEN 1000 MG: 500 TABLET ORAL at 08:03

## 2024-03-23 RX ADMIN — CEFTRIAXONE SODIUM 2 G: 2 INJECTION, POWDER, FOR SOLUTION INTRAMUSCULAR; INTRAVENOUS at 08:03

## 2024-03-23 RX ADMIN — SODIUM CHLORIDE, POTASSIUM CHLORIDE, SODIUM LACTATE AND CALCIUM CHLORIDE 1000 ML: 600; 310; 30; 20 INJECTION, SOLUTION INTRAVENOUS at 08:03

## 2024-03-23 NOTE — Clinical Note
"Derik Ferreiramargaret Mccoy was seen and treated in our emergency department on 3/23/2024.  He may return to work on 03/26/2024.       If you have any questions or concerns, please don't hesitate to call.      Digna Bowden RN    "

## 2024-03-23 NOTE — Clinical Note
Diagnosis: Cellulitis [[id number]]   Future Attending Provider: SARAH BOWLING [[id number]]   Place in Observation: [facility] [0852]

## 2024-03-24 ENCOUNTER — HOSPITAL ENCOUNTER (OUTPATIENT)
Facility: HOSPITAL | Age: 39
Discharge: LEFT BEFORE DISCHARGE | End: 2024-03-25
Attending: EMERGENCY MEDICINE | Admitting: INTERNAL MEDICINE

## 2024-03-24 VITALS
BODY MASS INDEX: 35 KG/M2 | HEIGHT: 71 IN | DIASTOLIC BLOOD PRESSURE: 78 MMHG | WEIGHT: 250 LBS | HEART RATE: 83 BPM | TEMPERATURE: 98 F | SYSTOLIC BLOOD PRESSURE: 129 MMHG | OXYGEN SATURATION: 95 % | RESPIRATION RATE: 18 BRPM

## 2024-03-24 DIAGNOSIS — L03.115 CELLULITIS OF RIGHT LOWER EXTREMITY: Primary | ICD-10-CM

## 2024-03-24 PROBLEM — E66.9 OBESITY: Status: ACTIVE | Noted: 2024-03-24

## 2024-03-24 LAB
ANION GAP SERPL CALC-SCNC: 9 MMOL/L (ref 8–16)
BASOPHILS # BLD AUTO: 0.04 K/UL (ref 0–0.2)
BASOPHILS NFR BLD: 0.4 % (ref 0–1.9)
BUN SERPL-MCNC: 13 MG/DL (ref 6–20)
CALCIUM SERPL-MCNC: 8.7 MG/DL (ref 8.7–10.5)
CHLORIDE SERPL-SCNC: 103 MMOL/L (ref 95–110)
CO2 SERPL-SCNC: 24 MMOL/L (ref 23–29)
CREAT SERPL-MCNC: 0.9 MG/DL (ref 0.5–1.4)
DIFFERENTIAL METHOD BLD: ABNORMAL
EOSINOPHIL # BLD AUTO: 0.1 K/UL (ref 0–0.5)
EOSINOPHIL NFR BLD: 0.5 % (ref 0–8)
ERYTHROCYTE [DISTWIDTH] IN BLOOD BY AUTOMATED COUNT: 13.3 % (ref 11.5–14.5)
EST. GFR  (NO RACE VARIABLE): >60 ML/MIN/1.73 M^2
GLUCOSE SERPL-MCNC: 83 MG/DL (ref 70–110)
HCT VFR BLD AUTO: 44.9 % (ref 40–54)
HGB BLD-MCNC: 15.2 G/DL (ref 14–18)
IMM GRANULOCYTES # BLD AUTO: 0.04 K/UL (ref 0–0.04)
IMM GRANULOCYTES NFR BLD AUTO: 0.4 % (ref 0–0.5)
LYMPHOCYTES # BLD AUTO: 1.5 K/UL (ref 1–4.8)
LYMPHOCYTES NFR BLD: 16.7 % (ref 18–48)
MCH RBC QN AUTO: 29.9 PG (ref 27–31)
MCHC RBC AUTO-ENTMCNC: 33.9 G/DL (ref 32–36)
MCV RBC AUTO: 88 FL (ref 82–98)
MONOCYTES # BLD AUTO: 1.5 K/UL (ref 0.3–1)
MONOCYTES NFR BLD: 16.2 % (ref 4–15)
NEUTROPHILS # BLD AUTO: 6.1 K/UL (ref 1.8–7.7)
NEUTROPHILS NFR BLD: 65.8 % (ref 38–73)
NRBC BLD-RTO: 0 /100 WBC
PLATELET # BLD AUTO: 218 K/UL (ref 150–450)
PMV BLD AUTO: 11.4 FL (ref 9.2–12.9)
POTASSIUM SERPL-SCNC: 3.7 MMOL/L (ref 3.5–5.1)
RBC # BLD AUTO: 5.09 M/UL (ref 4.6–6.2)
SODIUM SERPL-SCNC: 136 MMOL/L (ref 136–145)
WBC # BLD AUTO: 9.21 K/UL (ref 3.9–12.7)

## 2024-03-24 PROCEDURE — 96366 THER/PROPH/DIAG IV INF ADDON: CPT

## 2024-03-24 PROCEDURE — G0378 HOSPITAL OBSERVATION PER HR: HCPCS

## 2024-03-24 PROCEDURE — 96372 THER/PROPH/DIAG INJ SC/IM: CPT | Performed by: INTERNAL MEDICINE

## 2024-03-24 PROCEDURE — 85025 COMPLETE CBC W/AUTO DIFF WBC: CPT | Performed by: NURSE PRACTITIONER

## 2024-03-24 PROCEDURE — 25000003 PHARM REV CODE 250: Performed by: INTERNAL MEDICINE

## 2024-03-24 PROCEDURE — 63600175 PHARM REV CODE 636 W HCPCS: Performed by: EMERGENCY MEDICINE

## 2024-03-24 PROCEDURE — 63600175 PHARM REV CODE 636 W HCPCS: Performed by: INTERNAL MEDICINE

## 2024-03-24 PROCEDURE — 80048 BASIC METABOLIC PNL TOTAL CA: CPT | Performed by: NURSE PRACTITIONER

## 2024-03-24 PROCEDURE — 96365 THER/PROPH/DIAG IV INF INIT: CPT

## 2024-03-24 PROCEDURE — 96368 THER/DIAG CONCURRENT INF: CPT

## 2024-03-24 PROCEDURE — 99285 EMERGENCY DEPT VISIT HI MDM: CPT | Mod: 25

## 2024-03-24 RX ORDER — ONDANSETRON HYDROCHLORIDE 2 MG/ML
4 INJECTION, SOLUTION INTRAVENOUS EVERY 6 HOURS PRN
Status: DISCONTINUED | OUTPATIENT
Start: 2024-03-24 | End: 2024-03-25 | Stop reason: HOSPADM

## 2024-03-24 RX ORDER — TALC
6 POWDER (GRAM) TOPICAL NIGHTLY PRN
Status: DISCONTINUED | OUTPATIENT
Start: 2024-03-24 | End: 2024-03-25 | Stop reason: HOSPADM

## 2024-03-24 RX ORDER — VANCOMYCIN HCL IN 5 % DEXTROSE 1G/250ML
2000 PLASTIC BAG, INJECTION (ML) INTRAVENOUS ONCE
Status: COMPLETED | OUTPATIENT
Start: 2024-03-24 | End: 2024-03-24

## 2024-03-24 RX ORDER — LEVOFLOXACIN 5 MG/ML
500 INJECTION, SOLUTION INTRAVENOUS
Status: DISCONTINUED | OUTPATIENT
Start: 2024-03-24 | End: 2024-03-25 | Stop reason: HOSPADM

## 2024-03-24 RX ORDER — ACETAMINOPHEN 325 MG/1
650 TABLET ORAL EVERY 8 HOURS PRN
Status: DISCONTINUED | OUTPATIENT
Start: 2024-03-24 | End: 2024-03-25 | Stop reason: HOSPADM

## 2024-03-24 RX ORDER — HYDROCODONE BITARTRATE AND ACETAMINOPHEN 5; 325 MG/1; MG/1
1 TABLET ORAL EVERY 6 HOURS PRN
Status: DISCONTINUED | OUTPATIENT
Start: 2024-03-24 | End: 2024-03-25 | Stop reason: HOSPADM

## 2024-03-24 RX ORDER — LANOLIN ALCOHOL/MO/W.PET/CERES
800 CREAM (GRAM) TOPICAL
Status: DISCONTINUED | OUTPATIENT
Start: 2024-03-24 | End: 2024-03-25 | Stop reason: HOSPADM

## 2024-03-24 RX ORDER — SODIUM,POTASSIUM PHOSPHATES 280-250MG
2 POWDER IN PACKET (EA) ORAL
Status: DISCONTINUED | OUTPATIENT
Start: 2024-03-24 | End: 2024-03-25 | Stop reason: HOSPADM

## 2024-03-24 RX ORDER — FAMOTIDINE 20 MG/1
20 TABLET, FILM COATED ORAL 2 TIMES DAILY
Status: DISCONTINUED | OUTPATIENT
Start: 2024-03-24 | End: 2024-03-25 | Stop reason: HOSPADM

## 2024-03-24 RX ORDER — ALUMINUM HYDROXIDE, MAGNESIUM HYDROXIDE, AND SIMETHICONE 1200; 120; 1200 MG/30ML; MG/30ML; MG/30ML
30 SUSPENSION ORAL 4 TIMES DAILY PRN
Status: DISCONTINUED | OUTPATIENT
Start: 2024-03-24 | End: 2024-03-25 | Stop reason: HOSPADM

## 2024-03-24 RX ORDER — ACETAMINOPHEN 325 MG/1
650 TABLET ORAL EVERY 4 HOURS PRN
Status: DISCONTINUED | OUTPATIENT
Start: 2024-03-24 | End: 2024-03-25 | Stop reason: HOSPADM

## 2024-03-24 RX ORDER — ENOXAPARIN SODIUM 100 MG/ML
40 INJECTION SUBCUTANEOUS EVERY 24 HOURS
Status: DISCONTINUED | OUTPATIENT
Start: 2024-03-24 | End: 2024-03-25 | Stop reason: HOSPADM

## 2024-03-24 RX ADMIN — FAMOTIDINE 20 MG: 20 TABLET, FILM COATED ORAL at 08:03

## 2024-03-24 RX ADMIN — ENOXAPARIN SODIUM 40 MG: 40 INJECTION SUBCUTANEOUS at 05:03

## 2024-03-24 RX ADMIN — VANCOMYCIN HYDROCHLORIDE 2000 MG: 1 INJECTION, POWDER, LYOPHILIZED, FOR SOLUTION INTRAVENOUS at 03:03

## 2024-03-24 RX ADMIN — LEVOFLOXACIN 500 MG: 5 INJECTION, SOLUTION INTRAVENOUS at 03:03

## 2024-03-24 NOTE — H&P
[facility] - Emergency Dept  Hospital Medicine  History & Physical    Patient Name: Derik Mccoy Jr.  MRN: [id number]  Patient Class: OP- Observation  Admission Date: 3/24/2024  Attending Physician: Fred Rose MD   Primary Care Provider: Jolynn Primary Doctor         Patient information was obtained from patient, past medical records, and ER records.     Subjective:     Principal Problem:Cellulitis of right lower extremity    Chief Complaint:   Chief Complaint   Patient presents with    Leg Swelling     X 3 DAYS, SEEN HERE AND HAD TO LEAVE LASTNITE,        HPI: 38 year old pt getting admitted with RLE cellulitis   Pt started having redness and swelling on RLE 2 days ago  Yesterday he was having chills/fever/palpitations  Pt came to ER yesterday night and had iv abx  Pt has to take care of his young daughter and he left AMA  Today pt came back and got admitted       Past Medical History:   Diagnosis Date    Obese body habitus        Past Surgical History:   Procedure Laterality Date    NO PAST SURGERIES         Review of patient's allergies indicates:  No Known Allergies    Current Facility-Administered Medications on File Prior to Encounter   Medication    [COMPLETED] acetaminophen tablet 1,000 mg    [COMPLETED] cefTRIAXone (ROCEPHIN) 2 g in dextrose 5 % 100 mL IVPB (ready to mix)    [COMPLETED] lactated ringers bolus 1,000 mL    [COMPLETED] vancomycin in dextrose 5 % 1 gram/250 mL IVPB 1,000 mg    And    [COMPLETED] vancomycin in dextrose 5 % 1 gram/250 mL IVPB 1,000 mg    [DISCONTINUED] acetaminophen tablet 650 mg    [DISCONTINUED] acetaminophen tablet 650 mg    [DISCONTINUED] aluminum-magnesium hydroxide-simethicone 200-200-20 mg/5 mL suspension 30 mL    [DISCONTINUED] dextrose 50% injection 12.5 g    [DISCONTINUED] dextrose 50% injection 25 g    [DISCONTINUED] enoxaparin injection 40 mg    [DISCONTINUED] glucagon (human recombinant) injection 1 mg    [DISCONTINUED] glucose chewable tablet 16  g    [DISCONTINUED] glucose chewable tablet 24 g    [DISCONTINUED] HYDROcodone-acetaminophen 5-325 mg per tablet 1 tablet    [COMPLETED] lactated ringers bolus 3,402 mL    [DISCONTINUED] magnesium oxide tablet 800 mg    [DISCONTINUED] magnesium oxide tablet 800 mg    [DISCONTINUED] melatonin tablet 6 mg    [DISCONTINUED] naloxone 0.4 mg/mL injection 0.02 mg    [DISCONTINUED] ondansetron injection 4 mg    [DISCONTINUED] potassium bicarbonate disintegrating tablet 35 mEq    [DISCONTINUED] potassium bicarbonate disintegrating tablet 50 mEq    [DISCONTINUED] potassium bicarbonate disintegrating tablet 60 mEq    [DISCONTINUED] potassium, sodium phosphates 280-160-250 mg packet 2 packet    [DISCONTINUED] potassium, sodium phosphates 280-160-250 mg packet 2 packet    [DISCONTINUED] potassium, sodium phosphates 280-160-250 mg packet 2 packet    [DISCONTINUED] sodium chloride 0.9% flush 2 mL    [DISCONTINUED] vancomycin - pharmacy to dose     Current Outpatient Medications on File Prior to Encounter   Medication Sig    ascorbic acid-multivit-min (EMERGEN-C) 1,000 mg PwEP Take 1 packet by mouth once daily.    doxycycline (VIBRAMYCIN) 100 MG Cap Take 1 capsule (100 mg total) by mouth 2 (two) times daily. for 10 days    sulfamethoxazole-trimethoprim 800-160mg (BACTRIM DS) 800-160 mg Tab Take 1 tablet by mouth 2 (two) times daily. for 10 days     Family History       Problem Relation (Age of Onset)    Hypertension Mother          Tobacco Use    Smoking status: Not on file    Smokeless tobacco: Not on file   Substance and Sexual Activity    Alcohol use: Not on file    Drug use: Not on file    Sexual activity: Not on file     Review of Systems   Constitutional:  Negative for activity change and appetite change.   HENT:  Negative for congestion and dental problem.    Eyes:  Negative for discharge and itching.   Respiratory:  Negative for shortness of breath.    Cardiovascular:  Negative for chest pain.   Gastrointestinal:  Negative  for abdominal distention and abdominal pain.   Endocrine: Negative for cold intolerance.   Genitourinary:  Negative for difficulty urinating and dysuria.   Musculoskeletal:  Negative for arthralgias and back pain.   Skin:  Positive for color change and rash.   Neurological:  Negative for dizziness and facial asymmetry.   Hematological:  Negative for adenopathy.   Psychiatric/Behavioral:  Negative for agitation and behavioral problems.      Objective:     Vital Signs (Most Recent):  Temp: 98.2 °F (36.8 °C) (03/24/24 1756)  Pulse: 88 (03/24/24 1756)  Resp: 18 (03/24/24 1756)  BP: 137/78 (03/24/24 1756)  SpO2: 95 % (03/24/24 1756) Vital Signs (24h Range):  Temp:  [98.1 °F (36.7 °C)-101 °F (38.3 °C)] 98.2 °F (36.8 °C)  Pulse:  [] 88  Resp:  [16-23] 18  SpO2:  [94 %-98 %] 95 %  BP: (112-148)/(66-85) 137/78     Weight: (!) 137.4 kg (303 lb)  Body mass index is 42.26 kg/m².     Physical Exam  Vitals and nursing note reviewed.   Constitutional:       Appearance: He is well-developed.   HENT:      Head: Atraumatic.      Right Ear: External ear normal.      Left Ear: External ear normal.      Nose: Nose normal.      Mouth/Throat:      Mouth: Mucous membranes are moist.   Cardiovascular:      Rate and Rhythm: Normal rate.   Pulmonary:      Effort: Pulmonary effort is normal.   Musculoskeletal:         General: Normal range of motion.      Cervical back: Full passive range of motion without pain and normal range of motion.   Skin:     General: Skin is warm.      Comments: RLE cellulitis    Neurological:      Mental Status: He is alert and oriented to person, place, and time.   Psychiatric:         Behavior: Behavior normal.                Significant Labs: All pertinent labs within the past 24 hours have been reviewed.  CBC:   Recent Labs   Lab 03/23/24 2028 03/24/24  1236   WBC 15.64* 9.21   HGB 14.9 15.2   HCT 43.8 44.9    218     CMP:   Recent Labs   Lab 03/23/24 2028 03/24/24  1236   * 136   K 3.6 3.7     103   CO2 22* 24    83   BUN 13 13   CREATININE 0.9 0.9   CALCIUM 8.7 8.7   PROT 7.0  --    ALBUMIN 3.9  --    BILITOT 0.7  --    ALKPHOS 55  --    AST 47*  --    ALT 43  --    ANIONGAP 9 9       Significant Imaging: I have reviewed all pertinent imaging results/findings within the past 24 hours.    Assessment/Plan:     * Cellulitis of right lower extremity  Maintain iv abx  De escalate iv abx as needed  Possible home within 2 days with PO abx      Obesity  Body mass index is 42.26 kg/m². Morbid obesity complicates all aspects of disease management from diagnostic modalities to treatment.       VTE Risk Mitigation (From admission, onward)           Ordered     enoxaparin injection 40 mg  Daily         03/24/24 1409     IP VTE HIGH RISK PATIENT  Once         03/24/24 1409                                    Fred Rose MD  Department of Hospital Medicine  [facility] - Emergency Dept

## 2024-03-24 NOTE — HPI
38 year old pt getting admitted with RLE cellulitis   Pt started having redness and swelling on RLE 2 days ago  Yesterday he was having chills/fever/palpitations  Pt came to ER yesterday night and had iv abx  Pt has to take care of his young daughter and he left AMA  Today pt came back and got admitted

## 2024-03-24 NOTE — ED PROVIDER NOTES
Encounter Date: 3/23/2024       History     Chief Complaint   Patient presents with    Leg Swelling     X2 days RLE redness, warmth, swelling. +fever      Complaint is red leg numbness lost appetite and fever for 2 days.  Patient takes no medicines has no known medical history.  Heart rate 114 temperature 101° with a cellulitic swollen right lower leg.  The patient is septic at this point and will be treated as such.  No complaints otherwise        Review of patient's allergies indicates:  No Known Allergies  No past medical history on file.  No past surgical history on file.  No family history on file.     Review of Systems   Constitutional:  Negative for chills and fever.   HENT:  Negative for ear pain, rhinorrhea and sore throat.    Eyes:  Negative for pain and visual disturbance.   Respiratory:  Negative for cough and shortness of breath.    Cardiovascular:  Negative for chest pain and palpitations.   Gastrointestinal:  Negative for abdominal pain, constipation, diarrhea, nausea and vomiting.   Genitourinary:  Negative for dysuria, frequency, hematuria and urgency.   Musculoskeletal:  Negative for back pain, joint swelling and myalgias.   Skin:  Negative for rash.        Patient has cellulitic swollen right lower extremity.  No pustules noted no fluctuance noted.   Neurological:  Negative for dizziness, seizures, weakness and headaches.   Psychiatric/Behavioral:  Negative for dysphoric mood. The patient is not nervous/anxious.        Physical Exam     Initial Vitals [03/23/24 1959]   BP Pulse Resp Temp SpO2   (!) 141/85 (!) 114 18 (!) 101 °F (38.3 °C) (!) 94 %      MAP       --         Physical Exam    Nursing note and vitals reviewed.  Constitutional: He appears well-developed and well-nourished.   HENT:   Head: Normocephalic and atraumatic.   Eyes: Conjunctivae, EOM and lids are normal. Pupils are equal, round, and reactive to light.   Neck: Trachea normal. Neck supple. No thyroid mass present.    Cardiovascular:  Normal rate, regular rhythm and normal heart sounds.           Pulmonary/Chest: Breath sounds normal. No respiratory distress.   Abdominal: Abdomen is soft. Bowel sounds are normal. There is no abdominal tenderness.   Musculoskeletal:         General: Normal range of motion.      Cervical back: Neck supple.     Neurological: He is alert and oriented to person, place, and time. He has normal strength and normal reflexes. No cranial nerve deficit or sensory deficit.   Skin: Skin is warm and dry.   Patient with cellulitic swollen right lower leg.   Psychiatric: He has a normal mood and affect. His speech is normal and behavior is normal. Judgment and thought content normal.         ED Course   Procedures  Labs Reviewed   CBC W/ AUTO DIFFERENTIAL - Abnormal; Notable for the following components:       Result Value    WBC 15.64 (*)     Immature Granulocytes 0.8 (*)     Gran # (ANC) 13.0 (*)     Immature Grans (Abs) 0.13 (*)     Mono # 1.4 (*)     Gran % 83.1 (*)     Lymph % 6.9 (*)     All other components within normal limits   COMPREHENSIVE METABOLIC PANEL - Abnormal; Notable for the following components:    Sodium 131 (*)     CO2 22 (*)     AST 47 (*)     All other components within normal limits   PHOSPHORUS - Abnormal; Notable for the following components:    Phosphorus 2.0 (*)     All other components within normal limits   CULTURE, BLOOD   CULTURE, BLOOD   MAGNESIUM   LACTIC ACID, PLASMA          Imaging Results              US Lower Extremity Veins Right (Final result)  Result time 03/23/24 22:23:09      Final result by Kaden Leon MD (03/23/24 22:23:09)                   Narrative:    US LOWER EXTREMITY VEINS LIMITED FOLLOW UP RIGHT    ADDITIONAL PERTINENT HISTORY:  Cellulitis.    COMPARISON STUDIES:   None.    FINDINGS:  Grayscale compression, duplex and color Doppler interrogation of the right lower extremity deep veins from common femoral vein to proximal calf was performed. The greater  saphenous vein in the ipsilateral proximal thigh was evaluated using similar technique.    Right lower extremity:  Common femoral vein:  Negative.  Femoral vein:  Negative.  Deep femoral vein:  Negative.  Popliteal vein:  Negative.  Visualized deep calf veins  Negative.    Greater saphenous vein in the proximal thigh:  Negative.  Popliteal fossa: negative    IMPRESSION:  1. No evidence of deep venous thrombosis involving the right lower extremity.    Electronically signed by:  Kaden Leon MD  03/23/2024 10:23 PM CDT Workstation: [id number]                                     X-Ray Chest 1 View (Final result)  Result time 03/23/24 22:23:53      Final result by Kaden Leon MD (03/23/24 22:23:53)                   Narrative:    XR CHEST 1 VIEW    COMPARISONS: January 27, 2023    ADDITIONAL PERTINENT HISTORY: Sepsis with leg swelling    FINDINGS:    Life-support:  None.    Cardiomediastinal silhouette:  Interval development of moderate cardiomegaly.    Pulmonary vasculature:  Mild pulmonary edema new since previous exam    Lung fields:  Negative.    Pleural spaces: Negative.    Osseous structures:  Negative.    Surrounding soft tissues:  Negative.      IMPRESSION:  1. Interval development of mild congestive heart failure.    Electronically signed by:  Kaden Leon MD  03/23/2024 10:23 PM CDT Workstation: [id number]                                     Medications   lactated ringers bolus 1,000 mL (0 mLs Intravenous Stopped 3/23/24 2134)   cefTRIAXone (ROCEPHIN) 2 g in dextrose 5 % 100 mL IVPB (ready to mix) (0 g Intravenous Stopped 3/23/24 2104)   acetaminophen tablet 1,000 mg (1,000 mg Oral Given 3/23/24 2039)   vancomycin in dextrose 5 % 1 gram/250 mL IVPB 1,000 mg (0 mg Intravenous Stopped 3/24/24 0026)     And   vancomycin in dextrose 5 % 1 gram/250 mL IVPB 1,000 mg (0 mg Intravenous Stopped 3/24/24 0026)     Medical Decision Making  The patient has a definite severe leg cellulitis refuses to be admission sign  AMA will discharge with doxy and Bactrim and instructions to return as needed and to follow up with his doctor.     This patient does have evidence of infective focus  My overall impression is sepsis.  Source: Skin and Soft Tissue (location leg)  Antibiotics given- Antibiotics (72h ago, onward)    None      Latest lactate reviewed-  Lab             03/23/24 2028          LACTATE      0.9           Organ dysfunction indicated     Post- resuscitation assessment No - Post resuscitation assessment not needed       Will Not start Pressors- Levophed for MAP of 65  Source control achieved by:         Amount and/or Complexity of Data Reviewed  Radiology: ordered.  Discussion of management or test interpretation with external provider(s): Patient was to be admitted get a 2nd lactate but the patient left AMA    Risk  OTC drugs.  Prescription drug management.    He                                  Clinical Impression:  Final diagnoses:  [A41.9] Sepsis  [L03.90] Cellulitis          ED Disposition Condition    AMA Stable                Penny Sanchez MD  03/24/24 8572

## 2024-03-24 NOTE — ED PROVIDER NOTES
Encounter Date: 3/24/2024       History     Chief Complaint   Patient presents with    Leg Swelling     X 3 DAYS, SEEN HERE AND HAD TO LEAVE [facility]STNITE,     Emergent evaluation of a 38-year-old male with obesity, no history of diabetes or immunocompromised re presents to the ER after leaving AMA last night for right lower extremity cellulitis with sepsis patient had presented to the ER was tachycardic 114 had a temperature of 101° and cellulitis to the right lower leg for 2 days he also reported that he was feeling numbness in the right lower extremity and decreased appetite.  He was treated in the ER with Rocephin at 8:39 p.m. and vancomycin shortly after lab work was performed and patient ultimately decided to leave AMA he was written for doxycycline and Bactrim which she had not fill because he is felt he would come back in the morning.  He reports overall the redness is slightly improved did not have fever chills or sweats overnight no nausea or vomiting.      Review of patient's allergies indicates:  No Known Allergies  Past Medical History:   Diagnosis Date    Obese body habitus      History reviewed. No pertinent surgical history.  History reviewed. No pertinent family history.     Review of Systems   Constitutional:  Negative for activity change, appetite change, chills, diaphoresis, fatigue and fever.   HENT:  Negative for congestion, postnasal drip and rhinorrhea.    Respiratory:  Negative for cough, chest tightness, shortness of breath and wheezing.    Cardiovascular:  Negative for chest pain and palpitations.   Gastrointestinal:  Negative for abdominal pain, nausea and vomiting.   Musculoskeletal:  Positive for myalgias. Negative for neck pain and neck stiffness.   Skin:  Positive for color change and rash. Negative for pallor and wound.   Allergic/Immunologic: Negative for immunocompromised state.   Neurological:  Negative for dizziness, weakness, light-headedness, numbness and headaches.    Psychiatric/Behavioral:  Negative for confusion.    All other systems reviewed and are negative.      Physical Exam     Initial Vitals [03/24/24 1206]   BP Pulse Resp Temp SpO2   (!) 142/78 90 16 99.3 °F (37.4 °C) 98 %      MAP       --         Physical Exam    Nursing note and vitals reviewed.  Constitutional: He appears well-developed and well-nourished. He is not diaphoretic. No distress.   HENT:   Head: Normocephalic and atraumatic.   Right Ear: External ear normal.   Left Ear: External ear normal.   Nose: Nose normal.   Mouth/Throat: Oropharynx is clear and moist.   Eyes: Conjunctivae and EOM are normal. Pupils are equal, round, and reactive to light.   Neck: Neck supple. No tracheal deviation present.   Normal range of motion.  Cardiovascular:  Normal rate, regular rhythm, normal heart sounds and intact distal pulses.     Exam reveals no gallop and no friction rub.       No murmur heard.  Pulmonary/Chest: Breath sounds normal. No stridor. No respiratory distress. He has no wheezes. He has no rhonchi. He has no rales. He exhibits no tenderness.   Musculoskeletal:         General: Tenderness and edema present. Normal range of motion.      Cervical back: Normal range of motion and neck supple.      Comments:  cellulitis of the right lower extremity V not quite circumferential around the right calf does not extend into the foot or pass the knee.  Tender to palpation 2+ DP and posterior tibial pulse     Neurological: He is alert and oriented to person, place, and time. He has normal strength. No cranial nerve deficit or sensory deficit.   Skin: Skin is warm and dry. No rash noted. There is erythema. No pallor.   Psychiatric: He has a normal mood and affect. His behavior is normal. Judgment and thought content normal.         ED Course   Procedures  Labs Reviewed   CBC W/ AUTO DIFFERENTIAL - Abnormal; Notable for the following components:       Result Value    Mono # 1.5 (*)     Lymph % 16.7 (*)     Mono % 16.2  (*)     All other components within normal limits   BASIC METABOLIC PANEL          Imaging Results    None          Medications   vancomycin - pharmacy to dose (has no administration in time range)     Medical Decision Making  Emergent evaluation of a 38-year-old male with obesity, no history of diabetes or immunocompromised re presents to the ER after leaving AMA last night for right lower extremity cellulitis with sepsis patient had presented to the ER was tachycardic 114 had a temperature of 101° and cellulitis to the right lower leg for 2 days he also reported that he was feeling numbness in the right lower extremity and decreased appetite.  He was treated in the ER with Rocephin at 8:39 p.m. and vancomycin shortly after lab work was performed and patient ultimately decided to leave AMA he was written for doxycycline and Bactrim which she had not fill because he is felt he would come back in the morning.  He reports overall the redness is slightly improved did not have fever chills or sweats overnight no nausea or vomiting.  On physical exam patient was in no distress blood pressure 142/78 temperature 99.3° sats 98% on room air respirations 16 pulse 90.  Patient was cellulitis of the right lower extremity V not quite circumferential around the right calf does not extend into the foot or pass the knee.  Tender to palpation 2+ DP and posterior tibial pulse normal cardiac and lung exam  MDM    Patient presents for emergent evaluation of acute right lower extremity cellulitis and sepsis last night left AMA returned this morning did not take antibiotics prescribed that poses a possible threat to life and/or bodily function.    Differential diagnosis includes but is not limited to cellulitis necrotizing fasciitis sepsis  In the ED patient found to have acute cellulitis right lower extremity  I ordered labs and personally reviewed them.  Labs significant for see below.      Admission MDM  I discussed the patient  presentation labs, ekg, X-rays, CT findings with the Hospitalist   Patient was managed in the ED with IV vancomycin, patient was not yet due for Rocephin as he was given at 8:39 p.m.  The response to treatment was good  Patient required emergent consultation to hospitalist for admission.  Margoth Mckeon M.D.       Amount and/or Complexity of Data Reviewed  External Data Reviewed: labs and notes.     Details: Labs Reviewed  CBC W/ AUTO DIFFERENTIAL - Abnormal; Notable for the following components:      Result Value     WBC 15.64 (*)      Immature Granulocytes 0.8 (*)      Gran # (ANC) 13.0 (*)      Immature Grans (Abs) 0.13 (*)      Mono # 1.4 (*)      Gran % 83.1 (*)      Lymph % 6.9 (*)      All other components within normal limits  COMPREHENSIVE METABOLIC PANEL - Abnormal; Notable for the following components:    Sodium 131 (*)      CO2 22 (*)      AST 47 (*)      All other components within normal limits  PHOSPHORUS - Abnormal; Notable for the following components:    Phosphorus 2.0 (*)      All other components within normal limits  CULTURE, BLOOD  CULTURE, BLOOD  MAGNESIUM  LACTIC ACID, PLASMA       Labs: ordered.     Details: Normal CBC    Risk  Decision regarding hospitalization.                                      Clinical Impression:  Final diagnoses:  [L03.115] Cellulitis of right lower extremity (Primary)          ED Disposition Condition    Admit Stable                Margoth Mckeon MD  03/24/24 6934

## 2024-03-24 NOTE — ASSESSMENT & PLAN NOTE
Body mass index is 42.26 kg/m². Morbid obesity complicates all aspects of disease management from diagnostic modalities to treatment.

## 2024-03-24 NOTE — HPI
This is a 39 y/o gentlema w/ pmh of      Patient is febrile w/ temp of 101; hemodynamically stable and saturating ok. Labs manifest leukocytosis w/ WBC of 15.64 and hyponatremia w/ Na of 131.

## 2024-03-24 NOTE — LETTER
March 25, 2024         [address]  [facility] [address]  Phone: [phone]  Fax: [phone]       Patient: Derik Mccoy   YOB: 1985  Date of Visit: 03/25/2024    To Whom It May Concern:    Lauren Mccoy  was at [facility] on 03/24/2024 to 03/25/2024. If you have any questions or concerns, or if I can be of further assistance, please do not hesitate to contact me.    Sincerely,    Ashlyn Pretty RN

## 2024-03-24 NOTE — FIRST PROVIDER EVALUATION
Emergency Department TeleTriage Encounter Note      CHIEF COMPLAINT    Chief Complaint   Patient presents with    Leg Swelling     X 3 DAYS, SEEN HERE AND HAD TO LEAVE [facility]STNITE,       VITAL SIGNS   Initial Vitals [03/24/24 1206]   BP Pulse Resp Temp SpO2   (!) 142/78 90 16 99.3 °F (37.4 °C) 98 %      MAP       --            ALLERGIES    Review of patient's allergies indicates:  No Known Allergies    PROVIDER TRIAGE NOTE  This is a teletriage evaluation of a 38 y.o. male presenting to the ED complaining of LE cellulitis. Was seen yesterday and left AMA.  Returns today for treatment.       Alert, no distress.     Initial orders will be placed and care will be transferred to an alternate provider when patient is roomed for a full evaluation. Any additional orders and the final disposition will be determined by that provider.         ORDERS  Labs Reviewed - No data to display    ED Orders (720h ago, onward)      Start Ordered     Status Ordering Provider    03/24/24 1226 03/24/24 1225  Insert Saline lock IV  Once         Ordered JOSEPH GARRETT.    03/24/24 1226 03/24/24 1225  CBC auto differential  STAT         Ordered JOSEPH GARRETT.    03/24/24 1226 03/24/24 1225  Basic metabolic panel  STAT         Ordered JOSEPH GARRETT.    03/24/24 1226 03/24/24 1225  Possible Hospitalization  Once        Comments: For further elaboration on reason for hospitalization.    Ordered JOSEPH GARRETT              Virtual Visit Note: The provider triage portion of this emergency department evaluation and documentation was performed via [employer], a HIPAA-compliant telemedicine application, in concert with a tele-presenter in the room. A face to face patient evaluation with one of my colleagues will occur once the patient is placed in an emergency department room.      DISCLAIMER: This note was prepared with [employer] voice recognition transcription software. Garbled syntax, mangled pronouns, and  other bizarre constructions may be attributed to that software system.

## 2024-03-24 NOTE — PHARMACY MED REC
"Admission Medication History     The home medication history was taken by Lynn Hall.    You may go to "Admission" then "Reconcile Home Medications" tabs to review and/or act upon these items.     The home medication list has been updated by the Pharmacy department.   Please read ALL comments highlighted in yellow.   Please address this information as you see fit.    Feel free to contact us if you have any questions or require assistance.      Medications listed below were obtained from: Patient/family and Analytic software- [employer]  Current Facility-Administered Medications on File Prior to Encounter   Medication Dose Route Frequency Provider Last Rate Last Admin    [COMPLETED] acetaminophen tablet 1,000 mg  1,000 mg Oral ED 1 Time Penny Sanchez MD   1,000 mg at 03/23/24 2039    [COMPLETED] cefTRIAXone (ROCEPHIN) 2 g in dextrose 5 % 100 mL IVPB (ready to mix)  2 g Intravenous Once Penny Sanchez MD   Stopped at 03/23/24 2104    [COMPLETED] lactated ringers bolus 1,000 mL  1,000 mL Intravenous Once Penny Sancehz MD   Stopped at 03/23/24 2134    [COMPLETED] vancomycin in dextrose 5 % 1 gram/250 mL IVPB 1,000 mg  1,000 mg Intravenous Once Penny Sanchez MD   Stopped at 03/24/24 0026    And    [COMPLETED] vancomycin in dextrose 5 % 1 gram/250 mL IVPB 1,000 mg  1,000 mg Intravenous Once Penny Sanchez MD   Stopped at 03/24/24 0026    [DISCONTINUED] acetaminophen tablet 650 mg  650 mg Oral Q8H PRN Saturnino Katz MD        [DISCONTINUED] acetaminophen tablet 650 mg  650 mg Oral Q4H PRN Saturnino Katz MD        [DISCONTINUED] aluminum-magnesium hydroxide-simethicone 200-200-20 mg/5 mL suspension 30 mL  30 mL Oral QID PRN Saturnino Katz MD        [DISCONTINUED] dextrose 50% injection 12.5 g  12.5 g Intravenous PRN Saturnino Katz MD        [DISCONTINUED] dextrose 50% injection 25 g  25 g Intravenous PRN Saturnino Katz MD        [DISCONTINUED] enoxaparin injection 40 mg  40 mg Subcutaneous Daily " Saturnino Katz MD        [DISCONTINUED] glucagon (human recombinant) injection 1 mg  1 mg Intramuscular PRN Saturnino Katz MD        [DISCONTINUED] glucose chewable tablet 16 g  16 g Oral PRN Saturnino Katz MD        [DISCONTINUED] glucose chewable tablet 24 g  24 g Oral PRN Saturnino Katz MD        [DISCONTINUED] HYDROcodone-acetaminophen 5-325 mg per tablet 1 tablet  1 tablet Oral Q6H PRN Saturnino Katz MD        [COMPLETED] lactated ringers bolus 3,402 mL  30 mL/kg Intravenous ED 1 Time Penny Sanchez MD   Stopped at 03/23/24 2031    [DISCONTINUED] magnesium oxide tablet 800 mg  800 mg Oral PRN Saturnino Katz MD        [DISCONTINUED] magnesium oxide tablet 800 mg  800 mg Oral PRN Saturnino Katz MD        [DISCONTINUED] melatonin tablet 6 mg  6 mg Oral Nightly PRN Saturnino Katz MD        [DISCONTINUED] naloxone 0.4 mg/mL injection 0.02 mg  0.02 mg Intravenous PRN Saturnino Katz MD        [DISCONTINUED] ondansetron injection 4 mg  4 mg Intravenous Q6H PRN Saturnino Katz MD        [DISCONTINUED] potassium bicarbonate disintegrating tablet 35 mEq  35 mEq Oral PRN Saturnino Katz MD        [DISCONTINUED] potassium bicarbonate disintegrating tablet 50 mEq  50 mEq Oral PRN Saturnino Katz MD        [DISCONTINUED] potassium bicarbonate disintegrating tablet 60 mEq  60 mEq Oral Saturnino Pretty MD        [DISCONTINUED] potassium, sodium phosphates 280-160-250 mg packet 2 packet  2 packet Oral PRN Saturnino Katz MD        [DISCONTINUED] potassium, sodium phosphates 280-160-250 mg packet 2 packet  2 packet Oral PRN Saturnino Katz MD        [DISCONTINUED] potassium, sodium phosphates 280-160-250 mg packet 2 packet  2 packet Oral PRN Saturnino Katz MD        [DISCONTINUED] sodium chloride 0.9% flush 2 mL  2 mL Intravenous Q12H PRN Saturnino Katz MD        [DISCONTINUED] vancomycin - pharmacy to dose   Intravenous pharmacy to manage frequency Penny Sanchez MD         Current Outpatient Medications on File Prior to Encounter    Medication Sig Dispense Refill    ascorbic acid-multivit-min (EMERGEN-C) 1,000 mg PwEP Take 1 packet by mouth once daily.      doxycycline (VIBRAMYCIN) 100 MG Cap Take 1 capsule (100 mg total) by mouth 2 (two) times daily. for 10 days 20 capsule 0    sulfamethoxazole-trimethoprim 800-160mg (BACTRIM DS) 800-160 mg Tab Take 1 tablet by mouth 2 (two) times daily. for 10 days 20 tablet 0           Lynn ARANDA 1924                  .

## 2024-03-24 NOTE — SUBJECTIVE & OBJECTIVE
No past medical history on file.    No past surgical history on file.    Review of patient's allergies indicates:  No Known Allergies    No current facility-administered medications on file prior to encounter.     Current Outpatient Medications on File Prior to Encounter   Medication Sig    ascorbic acid-multivit-min (EMERGEN-C) 1,000 mg PwEP Take 1 packet by mouth once daily.    [DISCONTINUED] albuterol (PROVENTIL/VENTOLIN HFA) 90 mcg/actuation inhaler Inhale 1-2 puffs into the lungs every 6 (six) hours as needed for Wheezing. Rescue    [DISCONTINUED] naproxen (NAPROSYN) 500 MG tablet Take 1 tablet (500 mg total) by mouth 2 (two) times daily with meals.    [DISCONTINUED] ondansetron (ZOFRAN) 4 MG tablet Take 1 tablet (4 mg total) by mouth every 6 (six) hours.     Family History    None       Tobacco Use    Smoking status: Not on file    Smokeless tobacco: Not on file   Substance and Sexual Activity    Alcohol use: Not on file    Drug use: Not on file    Sexual activity: Not on file     Review of Systems  Objective:     Vital Signs (Most Recent):  Temp: (!) 101 °F (38.3 °C) (03/23/24 2039)  Pulse: 101 (03/23/24 2059)  Resp: 18 (03/23/24 1959)  BP: 134/72 (03/23/24 2059)  SpO2: 95 % (03/23/24 2059) Vital Signs (24h Range):  Temp:  [101 °F (38.3 °C)] 101 °F (38.3 °C)  Pulse:  [101-114] 101  Resp:  [18] 18  SpO2:  [94 %-96 %] 95 %  BP: (132-141)/(72-85) 134/72     Weight: 113.4 kg (250 lb)  Body mass index is 34.87 kg/m².     Physical Exam   Gen: In bed, NAD  Head: Normocephalic  CVD: S1/S2  Resp: No distress or accessory muscle use  GI: Abd soft, ND  Skin: Warm, dry  Neuro: Alert         Significant Labs: All pertinent labs within the past 24 hours have been reviewed.  CBC:   Recent Labs   Lab 03/23/24 2028   WBC 15.64*   HGB 14.9   HCT 43.8        CMP:   Recent Labs   Lab 03/23/24 2028   *   K 3.6      CO2 22*      BUN 13   CREATININE 0.9   CALCIUM 8.7   PROT 7.0   ALBUMIN 3.9   BILITOT  0.7   ALKPHOS 55   AST 47*   ALT 43   ANIONGAP 9       Significant Imaging: I have reviewed all pertinent imaging results/findings within the past 24 hours.

## 2024-03-24 NOTE — SUBJECTIVE & OBJECTIVE
Past Medical History:   Diagnosis Date    Obese body habitus        Past Surgical History:   Procedure Laterality Date    NO PAST SURGERIES         Review of patient's allergies indicates:  No Known Allergies    Current Facility-Administered Medications on File Prior to Encounter   Medication    [COMPLETED] acetaminophen tablet 1,000 mg    [COMPLETED] cefTRIAXone (ROCEPHIN) 2 g in dextrose 5 % 100 mL IVPB (ready to mix)    [COMPLETED] lactated ringers bolus 1,000 mL    [COMPLETED] vancomycin in dextrose 5 % 1 gram/250 mL IVPB 1,000 mg    And    [COMPLETED] vancomycin in dextrose 5 % 1 gram/250 mL IVPB 1,000 mg    [DISCONTINUED] acetaminophen tablet 650 mg    [DISCONTINUED] acetaminophen tablet 650 mg    [DISCONTINUED] aluminum-magnesium hydroxide-simethicone 200-200-20 mg/5 mL suspension 30 mL    [DISCONTINUED] dextrose 50% injection 12.5 g    [DISCONTINUED] dextrose 50% injection 25 g    [DISCONTINUED] enoxaparin injection 40 mg    [DISCONTINUED] glucagon (human recombinant) injection 1 mg    [DISCONTINUED] glucose chewable tablet 16 g    [DISCONTINUED] glucose chewable tablet 24 g    [DISCONTINUED] HYDROcodone-acetaminophen 5-325 mg per tablet 1 tablet    [COMPLETED] lactated ringers bolus 3,402 mL    [DISCONTINUED] magnesium oxide tablet 800 mg    [DISCONTINUED] magnesium oxide tablet 800 mg    [DISCONTINUED] melatonin tablet 6 mg    [DISCONTINUED] naloxone 0.4 mg/mL injection 0.02 mg    [DISCONTINUED] ondansetron injection 4 mg    [DISCONTINUED] potassium bicarbonate disintegrating tablet 35 mEq    [DISCONTINUED] potassium bicarbonate disintegrating tablet 50 mEq    [DISCONTINUED] potassium bicarbonate disintegrating tablet 60 mEq    [DISCONTINUED] potassium, sodium phosphates 280-160-250 mg packet 2 packet    [DISCONTINUED] potassium, sodium phosphates 280-160-250 mg packet 2 packet    [DISCONTINUED] potassium, sodium phosphates 280-160-250 mg packet 2 packet    [DISCONTINUED] sodium chloride 0.9% flush 2 mL     [DISCONTINUED] vancomycin - pharmacy to dose     Current Outpatient Medications on File Prior to Encounter   Medication Sig    ascorbic acid-multivit-min (EMERGEN-C) 1,000 mg PwEP Take 1 packet by mouth once daily.    doxycycline (VIBRAMYCIN) 100 MG Cap Take 1 capsule (100 mg total) by mouth 2 (two) times daily. for 10 days    sulfamethoxazole-trimethoprim 800-160mg (BACTRIM DS) 800-160 mg Tab Take 1 tablet by mouth 2 (two) times daily. for 10 days     Family History       Problem Relation (Age of Onset)    Hypertension Mother          Tobacco Use    Smoking status: Not on file    Smokeless tobacco: Not on file   Substance and Sexual Activity    Alcohol use: Not on file    Drug use: Not on file    Sexual activity: Not on file     Review of Systems   Constitutional:  Negative for activity change and appetite change.   HENT:  Negative for congestion and dental problem.    Eyes:  Negative for discharge and itching.   Respiratory:  Negative for shortness of breath.    Cardiovascular:  Negative for chest pain.   Gastrointestinal:  Negative for abdominal distention and abdominal pain.   Endocrine: Negative for cold intolerance.   Genitourinary:  Negative for difficulty urinating and dysuria.   Musculoskeletal:  Negative for arthralgias and back pain.   Skin:  Positive for color change and rash.   Neurological:  Negative for dizziness and facial asymmetry.   Hematological:  Negative for adenopathy.   Psychiatric/Behavioral:  Negative for agitation and behavioral problems.      Objective:     Vital Signs (Most Recent):  Temp: 98.2 °F (36.8 °C) (03/24/24 1756)  Pulse: 88 (03/24/24 1756)  Resp: 18 (03/24/24 1756)  BP: 137/78 (03/24/24 1756)  SpO2: 95 % (03/24/24 1756) Vital Signs (24h Range):  Temp:  [98.1 °F (36.7 °C)-101 °F (38.3 °C)] 98.2 °F (36.8 °C)  Pulse:  [] 88  Resp:  [16-23] 18  SpO2:  [94 %-98 %] 95 %  BP: (112-148)/(66-85) 137/78     Weight: (!) 137.4 kg (303 lb)  Body mass index is 42.26 kg/m².     Physical  Exam  Vitals and nursing note reviewed.   Constitutional:       Appearance: He is well-developed.   HENT:      Head: Atraumatic.      Right Ear: External ear normal.      Left Ear: External ear normal.      Nose: Nose normal.      Mouth/Throat:      Mouth: Mucous membranes are moist.   Cardiovascular:      Rate and Rhythm: Normal rate.   Pulmonary:      Effort: Pulmonary effort is normal.   Musculoskeletal:         General: Normal range of motion.      Cervical back: Full passive range of motion without pain and normal range of motion.   Skin:     General: Skin is warm.      Comments: RLE cellulitis    Neurological:      Mental Status: He is alert and oriented to person, place, and time.   Psychiatric:         Behavior: Behavior normal.                Significant Labs: All pertinent labs within the past 24 hours have been reviewed.  CBC:   Recent Labs   Lab 03/23/24 2028 03/24/24  1236   WBC 15.64* 9.21   HGB 14.9 15.2   HCT 43.8 44.9    218     CMP:   Recent Labs   Lab 03/23/24 2028 03/24/24  1236   * 136   K 3.6 3.7    103   CO2 22* 24    83   BUN 13 13   CREATININE 0.9 0.9   CALCIUM 8.7 8.7   PROT 7.0  --    ALBUMIN 3.9  --    BILITOT 0.7  --    ALKPHOS 55  --    AST 47*  --    ALT 43  --    ANIONGAP 9 9       Significant Imaging: I have reviewed all pertinent imaging results/findings within the past 24 hours.

## 2024-03-24 NOTE — PHARMACY MED REC
"              .        Admission Medication History     The home medication history was taken by Lynn Hall.    You may go to "Admission" then "Reconcile Home Medications" tabs to review and/or act upon these items.     The home medication list has been updated by the Pharmacy department.   Please read ALL comments highlighted in yellow.   Please address this information as you see fit.    Feel free to contact us if you have any questions or require assistance.      Medications listed below were obtained from: Patient/family and Analytic software- [facility]  No current facility-administered medications on file prior to encounter.     Current Outpatient Medications on File Prior to Encounter   Medication Sig Dispense Refill    ascorbic acid-multivit-min (EMERGEN-C) 1,000 mg PwEP Take 1 packet by mouth once daily.      [DISCONTINUED] albuterol (PROVENTIL/VENTOLIN HFA) 90 mcg/actuation inhaler Inhale 1-2 puffs into the lungs every 6 (six) hours as needed for Wheezing. Rescue 8 g 0    [DISCONTINUED] naproxen (NAPROSYN) 500 MG tablet Take 1 tablet (500 mg total) by mouth 2 (two) times daily with meals. 30 tablet 0    [DISCONTINUED] ondansetron (ZOFRAN) 4 MG tablet Take 1 tablet (4 mg total) by mouth every 6 (six) hours. 12 tablet 0         Lynn Hall  EXT 1924      "

## 2024-03-24 NOTE — PROGRESS NOTES
"Pharmacokinetic Initial Assessment: IV Vancomycin    Assessment/Plan:    Initiate intravenous vancomycin with loading dose of 2000 mg once followed by a maintenance dose of vancomycin 2000 mg IV every 12 hours  Desired empiric serum trough concentration is 10 to 20 mcg/mL  Draw vancomycin trough level 60 min prior to fourth dose on 03/25 at approximately 1400  Pharmacy will continue to follow and monitor vancomycin.      Please contact pharmacy at extension [phone] with any questions regarding this assessment.     Thank you for the consult,   Kevin Rivera       Patient brief summary:  Derik Mccoy Jr. is a 38 y.o. male initiated on antimicrobial therapy with IV Vancomycin for treatment of suspected skin & soft tissue infection    Drug Allergies:   Review of patient's allergies indicates:  No Known Allergies    Actual Body Weight:   137.4 kg    Renal Function:   Estimated Creatinine Clearance: 156 mL/min (based on SCr of 0.9 mg/dL).,     CBC (last 72 hours):  Recent Labs   Lab Result Units 03/23/24 2028 03/24/24  1236   WBC K/uL 15.64* 9.21   Hemoglobin g/dL 14.9 15.2   Hematocrit % 43.8 44.9   Platelets K/uL 212 218   Gran % % 83.1* 65.8   Lymph % % 6.9* 16.7*   Mono % % 8.8 16.2*   Eosinophil % % 0.1 0.5   Basophil % % 0.3 0.4   Differential Method  Automated Automated       Metabolic Panel (last 72 hours):  Recent Labs   Lab Result Units 03/23/24 2028 03/24/24  1236   Sodium mmol/L 131* 136   Potassium mmol/L 3.6 3.7   Chloride mmol/L 100 103   CO2 mmol/L 22* 24   Glucose mg/dL 103 83   BUN mg/dL 13 13   Creatinine mg/dL 0.9 0.9   Albumin g/dL 3.9  --    Total Bilirubin mg/dL 0.7  --    Alkaline Phosphatase U/L 55  --    AST U/L 47*  --    ALT U/L 43  --    Magnesium mg/dL 1.7  --    Phosphorus mg/dL 2.0*  --        Drug levels (last 3 results):  No results for input(s): "VANCOMYCINRA", "VANCORANDOM", "VANCOMYCINPE", "VANCOPEAK", "VANCOMYCINTR", "VANCOTROUGH" in the last 72 hours.    Microbiologic " Results:  Microbiology Results (last 7 days)       ** No results found for the last 168 hours. **

## 2024-03-25 VITALS
TEMPERATURE: 98 F | SYSTOLIC BLOOD PRESSURE: 133 MMHG | BODY MASS INDEX: 42.42 KG/M2 | RESPIRATION RATE: 18 BRPM | OXYGEN SATURATION: 97 % | HEIGHT: 71 IN | DIASTOLIC BLOOD PRESSURE: 77 MMHG | HEART RATE: 86 BPM | WEIGHT: 303 LBS

## 2024-03-25 LAB
ALBUMIN SERPL BCP-MCNC: 3.5 G/DL (ref 3.5–5.2)
ALP SERPL-CCNC: 46 U/L (ref 55–135)
ALT SERPL W/O P-5'-P-CCNC: 34 U/L (ref 10–44)
ANION GAP SERPL CALC-SCNC: 9 MMOL/L (ref 8–16)
AST SERPL-CCNC: 30 U/L (ref 10–40)
BASOPHILS # BLD AUTO: 0.05 K/UL (ref 0–0.2)
BASOPHILS NFR BLD: 0.9 % (ref 0–1.9)
BILIRUB SERPL-MCNC: 0.3 MG/DL (ref 0.1–1)
BUN SERPL-MCNC: 12 MG/DL (ref 6–20)
CALCIUM SERPL-MCNC: 8.6 MG/DL (ref 8.7–10.5)
CHLORIDE SERPL-SCNC: 104 MMOL/L (ref 95–110)
CO2 SERPL-SCNC: 23 MMOL/L (ref 23–29)
CREAT SERPL-MCNC: 0.8 MG/DL (ref 0.5–1.4)
DIFFERENTIAL METHOD BLD: ABNORMAL
EOSINOPHIL # BLD AUTO: 0.1 K/UL (ref 0–0.5)
EOSINOPHIL NFR BLD: 2.1 % (ref 0–8)
ERYTHROCYTE [DISTWIDTH] IN BLOOD BY AUTOMATED COUNT: 13.5 % (ref 11.5–14.5)
EST. GFR  (NO RACE VARIABLE): >60 ML/MIN/1.73 M^2
GLUCOSE SERPL-MCNC: 125 MG/DL (ref 70–110)
HCT VFR BLD AUTO: 43.5 % (ref 40–54)
HGB BLD-MCNC: 14.5 G/DL (ref 14–18)
IMM GRANULOCYTES # BLD AUTO: 0.04 K/UL (ref 0–0.04)
IMM GRANULOCYTES NFR BLD AUTO: 0.7 % (ref 0–0.5)
LYMPHOCYTES # BLD AUTO: 1.7 K/UL (ref 1–4.8)
LYMPHOCYTES NFR BLD: 29.6 % (ref 18–48)
MAGNESIUM SERPL-MCNC: 1.9 MG/DL (ref 1.6–2.6)
MCH RBC QN AUTO: 29.3 PG (ref 27–31)
MCHC RBC AUTO-ENTMCNC: 33.3 G/DL (ref 32–36)
MCV RBC AUTO: 88 FL (ref 82–98)
MONOCYTES # BLD AUTO: 0.9 K/UL (ref 0.3–1)
MONOCYTES NFR BLD: 16.3 % (ref 4–15)
NEUTROPHILS # BLD AUTO: 2.8 K/UL (ref 1.8–7.7)
NEUTROPHILS NFR BLD: 50.4 % (ref 38–73)
NRBC BLD-RTO: 0 /100 WBC
PLATELET # BLD AUTO: 227 K/UL (ref 150–450)
PMV BLD AUTO: 11.6 FL (ref 9.2–12.9)
POTASSIUM SERPL-SCNC: 3.5 MMOL/L (ref 3.5–5.1)
PROCALCITONIN SERPL IA-MCNC: 0.89 NG/ML (ref 0–0.5)
PROT SERPL-MCNC: 6.5 G/DL (ref 6–8.4)
RBC # BLD AUTO: 4.95 M/UL (ref 4.6–6.2)
SODIUM SERPL-SCNC: 136 MMOL/L (ref 136–145)
WBC # BLD AUTO: 5.6 K/UL (ref 3.9–12.7)

## 2024-03-25 PROCEDURE — 36415 COLL VENOUS BLD VENIPUNCTURE: CPT | Performed by: INTERNAL MEDICINE

## 2024-03-25 PROCEDURE — 80053 COMPREHEN METABOLIC PANEL: CPT | Performed by: INTERNAL MEDICINE

## 2024-03-25 PROCEDURE — 83735 ASSAY OF MAGNESIUM: CPT | Performed by: INTERNAL MEDICINE

## 2024-03-25 PROCEDURE — 96366 THER/PROPH/DIAG IV INF ADDON: CPT

## 2024-03-25 PROCEDURE — 84145 PROCALCITONIN (PCT): CPT | Performed by: INTERNAL MEDICINE

## 2024-03-25 PROCEDURE — 25000003 PHARM REV CODE 250: Performed by: INTERNAL MEDICINE

## 2024-03-25 PROCEDURE — 63600175 PHARM REV CODE 636 W HCPCS: Performed by: INTERNAL MEDICINE

## 2024-03-25 PROCEDURE — 85025 COMPLETE CBC W/AUTO DIFF WBC: CPT | Performed by: INTERNAL MEDICINE

## 2024-03-25 PROCEDURE — G0378 HOSPITAL OBSERVATION PER HR: HCPCS

## 2024-03-25 RX ADMIN — FAMOTIDINE 20 MG: 20 TABLET, FILM COATED ORAL at 08:03

## 2024-03-25 RX ADMIN — POTASSIUM BICARBONATE 50 MEQ: 977.5 TABLET, EFFERVESCENT ORAL at 08:03

## 2024-03-25 RX ADMIN — VANCOMYCIN HYDROCHLORIDE 2000 MG: 500 INJECTION, POWDER, LYOPHILIZED, FOR SOLUTION INTRAVENOUS at 02:03

## 2024-03-25 NOTE — PLAN OF CARE
Patient verbalized he is unable to stay the night d/t personal reasons. Patient requesting to leave AMA - Dr. Armas and primary nurse at bedside. Education provided, patient v/u and continued to state he needed to leave AMA. Patient agreeable to follow up at [facility]. Email sent to Leoncio at [facility] requesting hospital follow up apt - clinic to contact patient with apt information    Patient denied additional CM needs.    03/25/24 1232   Post-Acute Status   Post-Acute Authorization Other   Other Status Awaiting f/u Appts

## 2024-03-25 NOTE — NURSING
Pt stated that he could not stay another night in the hospital and agreed to sign the AMA form. IV removed. Pt ambulated down with all personal belongings.

## 2024-03-25 NOTE — PLAN OF CARE
Pt left AMA      03/25/24 1124   Final Note   Assessment Type Final Discharge Note   Anticipated Discharge Disposition Left Against

## 2024-03-25 NOTE — PLAN OF CARE
[facility]  Initial Discharge Assessment       Primary Care Provider: No, Primary Doctor    Admission Diagnosis: Cellulitis of right lower extremity [L03.115]    Admission Date: 3/24/2024  Expected Discharge Date: 3/25/2024     met with Pt at bedside to complete discharge assessment. Pt AAOx4s. Demographics verified. Pt has no pcp and no insurance  and doesn't want us to make appointment for him. SW gave Pt a financial assistance application at bedside.  No home health. No dialysis. Pt reports ability to complete ADLs without assistance. Pt verbalized plan to discharge home via family transport. Pt has no other needs to be addressed at this time.    Transition of Care Barriers: Unisured    Payor: /     Extended Emergency Contact Information  Primary Emergency Contact: Joshua Mccoy  Mobile Phone: [phone]  Relation: Father   needed? No    Discharge Plan A: Home  Discharge Plan B: Home      [id number] - [address] & [address] [address]  Phone: [phone] Fax: [phone]      Initial Assessment (most recent)       Adult Discharge Assessment - 03/25/24 1038          Discharge Assessment    Confirmed/corrected address, phone number and insurance Yes     Confirmed Demographics Correct on Facesheet     Source of Information patient     Communicated DANIELE with patient/caregiver Date not available/Unable to determine     Reason For Admission Cellulitis of right lower extremity     People in Home other relative(s)     Do you expect to return to your current living situation? Yes     Do you have help at home or someone to help you manage your care at home? Yes     Prior to hospitilization cognitive status: Alert/Oriented     Current cognitive status: Alert/Oriented     Walking or Climbing Stairs Difficulty no     Dressing/Bathing Difficulty no     Home Accessibility wheelchair accessible     Home Layout  Able to live on 1st floor     Equipment Currently Used at Home none     Readmission within 30 days? Yes     Patient currently being followed by outpatient case management? No     Do you currently have service(s) that help you manage your care at home? No     Do you have prescription coverage? No     Do you have any problems affording any of your prescribed medications? TBD     How do you get to doctors appointments? car, drives self     Are you on dialysis? No     Do you take coumadin? No     Discharge Plan A Home     Discharge Plan B Home     Discharge Plan discussed with: Patient     Transition of Care Barriers Unisured

## 2024-03-25 NOTE — PLAN OF CARE
Patient AAOx4. VSS; Ambulate independently. Tolerating po and Regular diet well. Condition stable.

## 2024-03-27 NOTE — HOSPITAL COURSE
He was put in observation and given a dose of IV levofloxacin and two doses of vancomycin.    The day after admission, he requested to leave against medical advice.  He understood the consequences of doing so and left the hospital.    Physical Exam  Vitals and nursing note reviewed.   Constitutional:       Appearance: He is well-developed.   Skin:     General: Skin is warm.      Comments: Right lower leg with erythema and warmth.  No open wounds.

## 2024-03-28 LAB
BACTERIA BLD CULT: NORMAL
BACTERIA BLD CULT: NORMAL

## 2024-04-11 LAB
OHS QRS DURATION: 88 MS
OHS QTC CALCULATION: 415 MS

## 2024-07-19 ENCOUNTER — OCCUPATIONAL HEALTH (OUTPATIENT)
Dept: URGENT CARE | Facility: CLINIC | Age: 39
End: 2024-07-19

## 2024-07-19 DIAGNOSIS — Z00.00 ROUTINE GENERAL MEDICAL EXAMINATION AT A HEALTH CARE FACILITY: Primary | ICD-10-CM

## 2024-07-19 NOTE — PROGRESS NOTES
Ds/ non dot physical   Advancement Flap (Double) Text: The defect edges were debeveled with a #15 scalpel blade.  Given the location of the defect and the proximity to free margins a double advancement flap was deemed most appropriate.  Using a sterile surgical marker, the appropriate advancement flaps were drawn incorporating the defect and placing the expected incisions within the relaxed skin tension lines where possible.    The area thus outlined was incised deep to adipose tissue with a #15 scalpel blade.  The skin margins were undermined to an appropriate distance in all directions utilizing iris scissors.